# Patient Record
Sex: MALE | Race: WHITE | NOT HISPANIC OR LATINO | Employment: OTHER | URBAN - METROPOLITAN AREA
[De-identification: names, ages, dates, MRNs, and addresses within clinical notes are randomized per-mention and may not be internally consistent; named-entity substitution may affect disease eponyms.]

---

## 2017-01-27 ENCOUNTER — COMMUNICATION - HEALTHEAST (OUTPATIENT)
Dept: FAMILY MEDICINE | Facility: CLINIC | Age: 67
End: 2017-01-27

## 2017-01-27 DIAGNOSIS — E78.5 HYPERLIPIDEMIA: ICD-10-CM

## 2017-01-28 ENCOUNTER — COMMUNICATION - HEALTHEAST (OUTPATIENT)
Dept: FAMILY MEDICINE | Facility: CLINIC | Age: 67
End: 2017-01-28

## 2017-01-28 DIAGNOSIS — E78.5 HYPERLIPIDEMIA: ICD-10-CM

## 2017-05-24 ENCOUNTER — OFFICE VISIT - HEALTHEAST (OUTPATIENT)
Dept: FAMILY MEDICINE | Facility: CLINIC | Age: 67
End: 2017-05-24

## 2017-05-24 DIAGNOSIS — L04.0 ACUTE CERVICAL ADENITIS: ICD-10-CM

## 2017-07-11 ENCOUNTER — OFFICE VISIT - HEALTHEAST (OUTPATIENT)
Dept: FAMILY MEDICINE | Facility: CLINIC | Age: 67
End: 2017-07-11

## 2017-07-11 DIAGNOSIS — E78.00 PURE HYPERCHOLESTEROLEMIA: ICD-10-CM

## 2017-07-11 DIAGNOSIS — R03.0 ELEVATED BLOOD PRESSURE READING WITHOUT DIAGNOSIS OF HYPERTENSION: ICD-10-CM

## 2017-07-11 DIAGNOSIS — Z00.00 ROUTINE GENERAL MEDICAL EXAMINATION AT A HEALTH CARE FACILITY: ICD-10-CM

## 2017-07-11 DIAGNOSIS — N40.0 BPH (BENIGN PROSTATIC HYPERPLASIA): ICD-10-CM

## 2017-07-11 DIAGNOSIS — J30.9 ALLERGIC RHINITIS: ICD-10-CM

## 2017-07-11 LAB
CHOLEST SERPL-MCNC: 161 MG/DL
FASTING STATUS PATIENT QL REPORTED: YES
HDLC SERPL-MCNC: 42 MG/DL
LDLC SERPL CALC-MCNC: 101 MG/DL
PSA SERPL-MCNC: 1.7 NG/ML (ref 0–4.5)
TRIGL SERPL-MCNC: 90 MG/DL

## 2017-07-12 ENCOUNTER — COMMUNICATION - HEALTHEAST (OUTPATIENT)
Dept: FAMILY MEDICINE | Facility: CLINIC | Age: 67
End: 2017-07-12

## 2017-07-13 ENCOUNTER — AMBULATORY - HEALTHEAST (OUTPATIENT)
Dept: FAMILY MEDICINE | Facility: CLINIC | Age: 67
End: 2017-07-13

## 2017-07-14 ENCOUNTER — COMMUNICATION - HEALTHEAST (OUTPATIENT)
Dept: FAMILY MEDICINE | Facility: CLINIC | Age: 67
End: 2017-07-14

## 2017-10-18 ENCOUNTER — COMMUNICATION - HEALTHEAST (OUTPATIENT)
Dept: FAMILY MEDICINE | Facility: CLINIC | Age: 67
End: 2017-10-18

## 2017-10-18 DIAGNOSIS — K21.9 GERD (GASTROESOPHAGEAL REFLUX DISEASE): ICD-10-CM

## 2017-10-18 DIAGNOSIS — K21.9 ESOPHAGEAL REFLUX: ICD-10-CM

## 2017-10-25 ENCOUNTER — AMBULATORY - HEALTHEAST (OUTPATIENT)
Dept: NURSING | Facility: CLINIC | Age: 67
End: 2017-10-25

## 2017-10-25 DIAGNOSIS — Z23 NEEDS FLU SHOT: ICD-10-CM

## 2017-11-02 ENCOUNTER — COMMUNICATION - HEALTHEAST (OUTPATIENT)
Dept: FAMILY MEDICINE | Facility: CLINIC | Age: 67
End: 2017-11-02

## 2017-12-07 ENCOUNTER — OFFICE VISIT - HEALTHEAST (OUTPATIENT)
Dept: FAMILY MEDICINE | Facility: CLINIC | Age: 67
End: 2017-12-07

## 2017-12-07 DIAGNOSIS — B35.1 TOENAIL FUNGUS: ICD-10-CM

## 2017-12-07 ASSESSMENT — MIFFLIN-ST. JEOR: SCORE: 1825.69

## 2017-12-24 ENCOUNTER — COMMUNICATION - HEALTHEAST (OUTPATIENT)
Dept: FAMILY MEDICINE | Facility: CLINIC | Age: 67
End: 2017-12-24

## 2018-04-16 ENCOUNTER — COMMUNICATION - HEALTHEAST (OUTPATIENT)
Dept: FAMILY MEDICINE | Facility: CLINIC | Age: 68
End: 2018-04-16

## 2018-04-16 DIAGNOSIS — K21.9 GERD (GASTROESOPHAGEAL REFLUX DISEASE): ICD-10-CM

## 2018-04-16 DIAGNOSIS — K21.9 ESOPHAGEAL REFLUX: ICD-10-CM

## 2018-07-16 ENCOUNTER — COMMUNICATION - HEALTHEAST (OUTPATIENT)
Dept: FAMILY MEDICINE | Facility: CLINIC | Age: 68
End: 2018-07-16

## 2018-07-16 DIAGNOSIS — K21.9 GERD (GASTROESOPHAGEAL REFLUX DISEASE): ICD-10-CM

## 2018-07-16 DIAGNOSIS — K21.9 ESOPHAGEAL REFLUX: ICD-10-CM

## 2018-07-17 ENCOUNTER — COMMUNICATION - HEALTHEAST (OUTPATIENT)
Dept: FAMILY MEDICINE | Facility: CLINIC | Age: 68
End: 2018-07-17

## 2018-07-17 ENCOUNTER — OFFICE VISIT - HEALTHEAST (OUTPATIENT)
Dept: FAMILY MEDICINE | Facility: CLINIC | Age: 68
End: 2018-07-17

## 2018-07-17 DIAGNOSIS — H61.21 EXCESSIVE CERUMEN IN RIGHT EAR CANAL: ICD-10-CM

## 2018-07-17 DIAGNOSIS — K21.9 GERD (GASTROESOPHAGEAL REFLUX DISEASE): ICD-10-CM

## 2018-07-17 DIAGNOSIS — E78.00 PURE HYPERCHOLESTEROLEMIA: ICD-10-CM

## 2018-07-17 DIAGNOSIS — Z00.00 WELLNESS EXAMINATION: ICD-10-CM

## 2018-07-17 DIAGNOSIS — K21.9 GASTROESOPHAGEAL REFLUX DISEASE WITHOUT ESOPHAGITIS: ICD-10-CM

## 2018-07-17 DIAGNOSIS — K21.9 ESOPHAGEAL REFLUX: ICD-10-CM

## 2018-07-17 LAB
ALBUMIN SERPL-MCNC: 4.1 G/DL (ref 3.5–5)
ALP SERPL-CCNC: 66 U/L (ref 45–120)
ALT SERPL W P-5'-P-CCNC: 13 U/L (ref 0–45)
ANION GAP SERPL CALCULATED.3IONS-SCNC: 10 MMOL/L (ref 5–18)
AST SERPL W P-5'-P-CCNC: 15 U/L (ref 0–40)
BILIRUB SERPL-MCNC: 1.4 MG/DL (ref 0–1)
BUN SERPL-MCNC: 14 MG/DL (ref 8–22)
CALCIUM SERPL-MCNC: 9.5 MG/DL (ref 8.5–10.5)
CHLORIDE BLD-SCNC: 105 MMOL/L (ref 98–107)
CO2 SERPL-SCNC: 26 MMOL/L (ref 22–31)
CREAT SERPL-MCNC: 0.96 MG/DL (ref 0.7–1.3)
ERYTHROCYTE [DISTWIDTH] IN BLOOD BY AUTOMATED COUNT: 11.9 % (ref 11–14.5)
GFR SERPL CREATININE-BSD FRML MDRD: >60 ML/MIN/1.73M2
GLUCOSE BLD-MCNC: 98 MG/DL (ref 70–125)
HCT VFR BLD AUTO: 45.5 % (ref 40–54)
HGB BLD-MCNC: 15.2 G/DL (ref 14–18)
INR PPP: 0.99 (ref 0.9–1.1)
MCH RBC QN AUTO: 29.6 PG (ref 27–34)
MCHC RBC AUTO-ENTMCNC: 33.3 G/DL (ref 32–36)
MCV RBC AUTO: 89 FL (ref 80–100)
PLATELET # BLD AUTO: 229 THOU/UL (ref 140–440)
PMV BLD AUTO: 7.3 FL (ref 7–10)
POTASSIUM BLD-SCNC: 4.2 MMOL/L (ref 3.5–5)
PROT SERPL-MCNC: 7.1 G/DL (ref 6–8)
PSA SERPL-MCNC: 1.6 NG/ML (ref 0–4.5)
RBC # BLD AUTO: 5.13 MILL/UL (ref 4.4–6.2)
SODIUM SERPL-SCNC: 141 MMOL/L (ref 136–145)
WBC: 5.9 THOU/UL (ref 4–11)

## 2018-07-17 ASSESSMENT — MIFFLIN-ST. JEOR: SCORE: 1809.3

## 2018-10-18 ENCOUNTER — COMMUNICATION - HEALTHEAST (OUTPATIENT)
Dept: FAMILY MEDICINE | Facility: CLINIC | Age: 68
End: 2018-10-18

## 2018-10-18 DIAGNOSIS — K21.9 ESOPHAGEAL REFLUX: ICD-10-CM

## 2018-10-18 DIAGNOSIS — K21.9 GERD (GASTROESOPHAGEAL REFLUX DISEASE): ICD-10-CM

## 2018-10-25 ENCOUNTER — AMBULATORY - HEALTHEAST (OUTPATIENT)
Dept: NURSING | Facility: CLINIC | Age: 68
End: 2018-10-25

## 2018-10-25 DIAGNOSIS — Z23 FLU VACCINE NEED: ICD-10-CM

## 2019-05-15 ENCOUNTER — COMMUNICATION - HEALTHEAST (OUTPATIENT)
Dept: FAMILY MEDICINE | Facility: CLINIC | Age: 69
End: 2019-05-15

## 2019-06-18 ENCOUNTER — COMMUNICATION - HEALTHEAST (OUTPATIENT)
Dept: FAMILY MEDICINE | Facility: CLINIC | Age: 69
End: 2019-06-18

## 2019-07-01 ENCOUNTER — COMMUNICATION - HEALTHEAST (OUTPATIENT)
Dept: FAMILY MEDICINE | Facility: CLINIC | Age: 69
End: 2019-07-01

## 2019-07-01 DIAGNOSIS — K21.9 ESOPHAGEAL REFLUX: ICD-10-CM

## 2019-07-01 DIAGNOSIS — K21.9 GERD (GASTROESOPHAGEAL REFLUX DISEASE): ICD-10-CM

## 2019-07-13 ENCOUNTER — COMMUNICATION - HEALTHEAST (OUTPATIENT)
Dept: FAMILY MEDICINE | Facility: CLINIC | Age: 69
End: 2019-07-13

## 2019-07-16 ENCOUNTER — OFFICE VISIT - HEALTHEAST (OUTPATIENT)
Dept: FAMILY MEDICINE | Facility: CLINIC | Age: 69
End: 2019-07-16

## 2019-07-16 DIAGNOSIS — K21.9 GASTROESOPHAGEAL REFLUX DISEASE WITHOUT ESOPHAGITIS: ICD-10-CM

## 2019-07-16 DIAGNOSIS — Z12.5 ENCOUNTER FOR SCREENING FOR MALIGNANT NEOPLASM OF PROSTATE: ICD-10-CM

## 2019-07-16 DIAGNOSIS — Z00.00 ENCOUNTER FOR MEDICARE ANNUAL WELLNESS EXAM: ICD-10-CM

## 2019-07-16 DIAGNOSIS — K21.9 ESOPHAGEAL REFLUX: ICD-10-CM

## 2019-07-16 DIAGNOSIS — K21.9 GERD (GASTROESOPHAGEAL REFLUX DISEASE): ICD-10-CM

## 2019-07-16 LAB
ANION GAP SERPL CALCULATED.3IONS-SCNC: 9 MMOL/L (ref 5–18)
BUN SERPL-MCNC: 17 MG/DL (ref 8–22)
CALCIUM SERPL-MCNC: 9.6 MG/DL (ref 8.5–10.5)
CHLORIDE BLD-SCNC: 105 MMOL/L (ref 98–107)
CHOLEST SERPL-MCNC: 199 MG/DL
CO2 SERPL-SCNC: 28 MMOL/L (ref 22–31)
CREAT SERPL-MCNC: 0.97 MG/DL (ref 0.7–1.3)
ERYTHROCYTE [DISTWIDTH] IN BLOOD BY AUTOMATED COUNT: 12.1 % (ref 11–14.5)
FASTING STATUS PATIENT QL REPORTED: YES
GFR SERPL CREATININE-BSD FRML MDRD: >60 ML/MIN/1.73M2
GLUCOSE BLD-MCNC: 98 MG/DL (ref 70–125)
HBA1C MFR BLD: 5.6 % (ref 3.5–6)
HCT VFR BLD AUTO: 43.5 % (ref 40–54)
HDLC SERPL-MCNC: 44 MG/DL
HGB BLD-MCNC: 14.8 G/DL (ref 14–18)
LDLC SERPL CALC-MCNC: 132 MG/DL
MCH RBC QN AUTO: 29.9 PG (ref 27–34)
MCHC RBC AUTO-ENTMCNC: 34.1 G/DL (ref 32–36)
MCV RBC AUTO: 88 FL (ref 80–100)
PLATELET # BLD AUTO: 252 THOU/UL (ref 140–440)
PMV BLD AUTO: 7.2 FL (ref 7–10)
POTASSIUM BLD-SCNC: 4.9 MMOL/L (ref 3.5–5)
PSA SERPL-MCNC: 1.8 NG/ML (ref 0–4.5)
RBC # BLD AUTO: 4.96 MILL/UL (ref 4.4–6.2)
SODIUM SERPL-SCNC: 142 MMOL/L (ref 136–145)
TRIGL SERPL-MCNC: 114 MG/DL
WBC: 7.1 THOU/UL (ref 4–11)

## 2019-07-16 ASSESSMENT — MIFFLIN-ST. JEOR: SCORE: 1802.49

## 2019-07-17 ENCOUNTER — COMMUNICATION - HEALTHEAST (OUTPATIENT)
Dept: FAMILY MEDICINE | Facility: CLINIC | Age: 69
End: 2019-07-17

## 2019-08-11 ENCOUNTER — COMMUNICATION - HEALTHEAST (OUTPATIENT)
Dept: TELEHEALTH | Facility: CLINIC | Age: 69
End: 2019-08-11

## 2019-09-19 ENCOUNTER — OFFICE VISIT - HEALTHEAST (OUTPATIENT)
Dept: FAMILY MEDICINE | Facility: CLINIC | Age: 69
End: 2019-09-19

## 2019-09-19 ENCOUNTER — COMMUNICATION - HEALTHEAST (OUTPATIENT)
Dept: FAMILY MEDICINE | Facility: CLINIC | Age: 69
End: 2019-09-19

## 2019-09-19 DIAGNOSIS — Z00.00 ROUTINE ADULT HEALTH MAINTENANCE: ICD-10-CM

## 2019-09-19 DIAGNOSIS — T70.29XS: ICD-10-CM

## 2019-09-19 DIAGNOSIS — Z00.00 WELLNESS EXAMINATION: ICD-10-CM

## 2019-09-19 DIAGNOSIS — Z23 ENCOUNTER FOR IMMUNIZATION: ICD-10-CM

## 2019-09-29 ENCOUNTER — OFFICE VISIT - HEALTHEAST (OUTPATIENT)
Dept: FAMILY MEDICINE | Facility: CLINIC | Age: 69
End: 2019-09-29

## 2019-09-30 ENCOUNTER — COMMUNICATION - HEALTHEAST (OUTPATIENT)
Dept: FAMILY MEDICINE | Facility: CLINIC | Age: 69
End: 2019-09-30

## 2019-10-09 ENCOUNTER — RECORDS - HEALTHEAST (OUTPATIENT)
Dept: ADMINISTRATIVE | Facility: OTHER | Age: 69
End: 2019-10-09

## 2020-06-30 ENCOUNTER — RECORDS - HEALTHEAST (OUTPATIENT)
Dept: ADMINISTRATIVE | Facility: OTHER | Age: 70
End: 2020-06-30

## 2020-07-13 ENCOUNTER — RECORDS - HEALTHEAST (OUTPATIENT)
Dept: ADMINISTRATIVE | Facility: OTHER | Age: 70
End: 2020-07-13

## 2020-07-21 ENCOUNTER — OFFICE VISIT - HEALTHEAST (OUTPATIENT)
Dept: FAMILY MEDICINE | Facility: CLINIC | Age: 70
End: 2020-07-21

## 2020-07-21 DIAGNOSIS — Z00.00 WELLNESS EXAMINATION: ICD-10-CM

## 2020-07-21 DIAGNOSIS — K21.9 GERD (GASTROESOPHAGEAL REFLUX DISEASE): ICD-10-CM

## 2020-07-21 DIAGNOSIS — K21.9 ESOPHAGEAL REFLUX: ICD-10-CM

## 2020-07-21 DIAGNOSIS — Z12.5 ENCOUNTER FOR SCREENING FOR MALIGNANT NEOPLASM OF PROSTATE: ICD-10-CM

## 2020-07-21 LAB
ANION GAP SERPL CALCULATED.3IONS-SCNC: 12 MMOL/L (ref 5–18)
BUN SERPL-MCNC: 18 MG/DL (ref 8–28)
CALCIUM SERPL-MCNC: 9.5 MG/DL (ref 8.5–10.5)
CHLORIDE BLD-SCNC: 105 MMOL/L (ref 98–107)
CHOLEST SERPL-MCNC: 197 MG/DL
CO2 SERPL-SCNC: 24 MMOL/L (ref 22–31)
CREAT SERPL-MCNC: 1.04 MG/DL (ref 0.7–1.3)
ERYTHROCYTE [DISTWIDTH] IN BLOOD BY AUTOMATED COUNT: 12.2 % (ref 11–14.5)
FASTING STATUS PATIENT QL REPORTED: YES
GFR SERPL CREATININE-BSD FRML MDRD: >60 ML/MIN/1.73M2
GLUCOSE BLD-MCNC: 103 MG/DL (ref 70–125)
HBA1C MFR BLD: 5.5 % (ref 3.5–6)
HCT VFR BLD AUTO: 44.2 % (ref 40–54)
HDLC SERPL-MCNC: 41 MG/DL
HGB BLD-MCNC: 15.3 G/DL (ref 14–18)
LDLC SERPL CALC-MCNC: 136 MG/DL
MCH RBC QN AUTO: 29.8 PG (ref 27–34)
MCHC RBC AUTO-ENTMCNC: 34.6 G/DL (ref 32–36)
MCV RBC AUTO: 86 FL (ref 80–100)
PLATELET # BLD AUTO: 229 THOU/UL (ref 140–440)
PMV BLD AUTO: 7.4 FL (ref 7–10)
POTASSIUM BLD-SCNC: 4.4 MMOL/L (ref 3.5–5)
PSA SERPL-MCNC: 1.3 NG/ML (ref 0–6.5)
RBC # BLD AUTO: 5.12 MILL/UL (ref 4.4–6.2)
SODIUM SERPL-SCNC: 141 MMOL/L (ref 136–145)
TRIGL SERPL-MCNC: 99 MG/DL
WBC: 5.8 THOU/UL (ref 4–11)

## 2020-07-21 RX ORDER — PANTOPRAZOLE SODIUM 40 MG/1
40 TABLET, DELAYED RELEASE ORAL DAILY
Qty: 90 TABLET | Refills: 3 | Status: SHIPPED | OUTPATIENT
Start: 2020-07-21 | End: 2021-07-26

## 2020-07-21 ASSESSMENT — ANXIETY QUESTIONNAIRES
1. FEELING NERVOUS, ANXIOUS, OR ON EDGE: NOT AT ALL
2. NOT BEING ABLE TO STOP OR CONTROL WORRYING: NOT AT ALL

## 2020-07-21 ASSESSMENT — MIFFLIN-ST. JEOR: SCORE: 1809.53

## 2020-07-22 ENCOUNTER — COMMUNICATION - HEALTHEAST (OUTPATIENT)
Dept: FAMILY MEDICINE | Facility: CLINIC | Age: 70
End: 2020-07-22

## 2020-09-21 ENCOUNTER — COMMUNICATION - HEALTHEAST (OUTPATIENT)
Dept: FAMILY MEDICINE | Facility: CLINIC | Age: 70
End: 2020-09-21

## 2020-09-24 ENCOUNTER — AMBULATORY - HEALTHEAST (OUTPATIENT)
Dept: NURSING | Facility: CLINIC | Age: 70
End: 2020-09-24

## 2020-11-24 ENCOUNTER — RECORDS - HEALTHEAST (OUTPATIENT)
Dept: ADMINISTRATIVE | Facility: OTHER | Age: 70
End: 2020-11-24

## 2020-11-24 LAB — OCCULT BLOOD (FIT)_EXT (HISTORICAL CONVERSION): NEGATIVE

## 2020-12-24 ENCOUNTER — RECORDS - HEALTHEAST (OUTPATIENT)
Dept: HEALTH INFORMATION MANAGEMENT | Facility: CLINIC | Age: 70
End: 2020-12-24

## 2021-05-04 ENCOUNTER — OFFICE VISIT - HEALTHEAST (OUTPATIENT)
Dept: FAMILY MEDICINE | Facility: CLINIC | Age: 71
End: 2021-05-04

## 2021-05-04 DIAGNOSIS — L30.9 ECZEMA, UNSPECIFIED TYPE: ICD-10-CM

## 2021-05-04 RX ORDER — CLOBETASOL PROPIONATE 0.5 MG/G
CREAM TOPICAL
Qty: 30 G | Refills: 3 | Status: SHIPPED | OUTPATIENT
Start: 2021-05-04 | End: 2023-05-08

## 2021-05-04 ASSESSMENT — MIFFLIN-ST. JEOR: SCORE: 1822

## 2021-05-27 VITALS
HEIGHT: 71 IN | WEIGHT: 231 LBS | DIASTOLIC BLOOD PRESSURE: 80 MMHG | BODY MASS INDEX: 32.34 KG/M2 | HEART RATE: 107 BPM | SYSTOLIC BLOOD PRESSURE: 154 MMHG | OXYGEN SATURATION: 99 %

## 2021-05-30 NOTE — PROGRESS NOTES
Assessment:      Annual Wellness Visit      Plan:      1. Encounter for Medicare annual wellness exam  - HM2(CBC w/o Differential)  - Basic Metabolic Panel  - Lipid Cascade  - PSA (Prostatic-Specific Antigen), Annual Screen  - Glycosylated Hemoglobin A1C  - PSA (Prostatic-Specific Antigen), Annual Screen    2. GERD (gastroesophageal reflux disease)  - pantoprazole (PROTONIX) 40 MG tablet; Take 1 tablet (40 mg total) by mouth daily.  Dispense: 90 tablet; Refill: 3    RTC 1 year      Subjective:      Duane E Miller is a 69 y.o. male who presents for a Subsequent Annual Wellness Visit.  He has no concerns.  He is busy at the [employer] this summer, attends [employer], and has 7 grandchildren.    GERD remained stable.    Healthy Habits:   Regular Exercise: Yes  Sunscreen Use: Yes  Healthy Diet: Yes  Dental Visits Regularly: Yes  Seat Belt: Yes  Sexually active: Yes  Monthly Self Testicular Exams:  No  Hemoccults: No  Flex Sig: No  Colonoscopy: Yes  Lipid Profile: Yes  Glucose Screen: Yes  Prevention of Osteoporosis: Yes  Last Dexa: No  Guns at Home:  Yes  Guns Safety Locks:  No      Immunization History   Administered Date(s) Administered     DT (pediatric) 01/01/1998     Hep A, historic 12/15/2009, 06/30/2010     Influenza [id number], 12/22/2009     Influenza high dose, seasonal 10/27/2015, 10/14/2016, 10/25/2017, 10/25/2018     Influenza, inj, historic,unspecified 11/20/2007, 10/28/2008, 09/17/2009, 10/19/2011     Influenza, seasonal,quad inj 6-35 mos 10/05/2010, 10/17/2012, 10/16/2013, 10/14/2014     Pneumo Conj 13-V (2010&after) 10/27/2015     Pneumo Polysac 23-V 07/11/2017     Td,adult,historic,unspecified 08/08/2007     Tdap 08/08/2007, 12/29/2013     ZOSTER, LIVE 12/07/2012     Immunization status: up to date and documented.    Current Outpatient Medications   Medication Sig Dispense Refill     aspirin 81 MG EC tablet Take 81 mg by mouth daily.       fexofenadine (ALLEGRA) 180 MG tablet Take 1 tablet (180 mg total) by mouth  daily. 90 tablet 1     pantoprazole (PROTONIX) 40 MG tablet TAKE 1 TABLET BY MOUTH EVERY DAY 90 tablet 0     cholecalciferol, vitamin D3, (VITAMIN D3) 1,000 unit capsule Use As Directed.       fluocinolone (SYNALAR) 0.01 % external solution Apply topically 2 (two) times a day as needed. Apply sparingly to ears       PSYLLIUM SEED, WITH SUGAR, (METAMUCIL ORAL) Take by mouth daily. Take 1 TBSP       No current facility-administered medications for this visit.      Past Medical History:   Diagnosis Date     Allergic rhinitis, cause unspecified      GERD (gastroesophageal reflux disease)      Hypertrophy of prostate without urinary obstruction and other lower urinary tract symptoms (LUTS)      Lumbago      Pure hypercholesterolemia      Past Surgical History:   Procedure Laterality Date     MOUTH SURGERY       Amoxicillin-pot clavulanate; Atorvastatin; Benzonatate; Simvastatin; Sulfa (sulfonamide antibiotics); and Tamiflu [oseltamivir]  Family History   Problem Relation Age of Onset     Leukemia Father      Breast cancer Mother      Hypertension Mother      Arthritis Mother      Neuropathy Mother      Asthma Daughter      Asthma Son      Brain cancer Paternal Grandfather      Social History     Socioeconomic History     Marital status:      Spouse name: Not on file     Number of children: Not on file     Years of education: Not on file     Highest education level: Not on file   Occupational History     Not on file   Social Needs     Financial resource strain: Not on file     Food insecurity:     Worry: Not on file     Inability: Not on file     Transportation needs:     Medical: Not on file     Non-medical: Not on file   Tobacco Use     Smoking status: Former Smoker   Substance and Sexual Activity     Alcohol use: Not on file     Drug use: Not on file     Sexual activity: Not on file   Lifestyle     Physical activity:     Days per week: Not on file     Minutes per session: Not on file     Stress: Not on file    Relationships     Social connections:     Talks on phone: Not on file     Gets together: Not on file     Attends Voodoo service: Not on file     Active member of club or organization: Not on file     Attends meetings of clubs or organizations: Not on file     Relationship status: Not on file     Intimate partner violence:     Fear of current or ex partner: Not on file     Emotionally abused: Not on file     Physically abused: Not on file     Forced sexual activity: Not on file   Other Topics Concern     Not on file   Social History Narrative     Not on file           Review of Systems  Review of Systems          Objective:     There were no vitals filed for this visit.        Constitutional:    --Vitals as above  --No acute distress  Eyes-  --Sclera noninjected  --Lids and conjunctiva normal  ENT-  --TMs clear  --Sclera noninjected  --Pharynx not erythematous  Neck-  --Neck supple with no cervical lymphadenopathy  Lungs-  --Clear to Auscultation  Heart-  --Regular rate and rhythm  Abdomen--  --Soft, non-tender, non-distended  Skin-  --Pink and dry  Psych-  --Alert and oriented  --Normal affect

## 2021-05-30 NOTE — TELEPHONE ENCOUNTER
Refill Approved    Rx renewed per Medication Renewal Policy. Medication was last renewed on 10/19/18  #90 R-2.    Last OV 7/17/18  *Patient will need follow up with PCP prior to future refills.     Cathy Reeves, [facility] Triage/Med Refill 7/2/2019     Requested Prescriptions   Pending Prescriptions Disp Refills     pantoprazole (PROTONIX) 40 MG tablet [Pharmacy Med Name: PANTOPRAZOLE SOD DR 40 MG TAB] 90 tablet 0     Sig: TAKE 1 TABLET BY MOUTH EVERY DAY       GI Medications Refill Protocol Passed - 7/1/2019 12:33 PM        Passed - PCP or prescribing provider visit in last 12 or next 3 months.     Last office visit with prescriber/PCP: 12/7/2017 Celia Lamb MD OR same dept: Visit date not found OR same specialty: 12/7/2017 Celia Lamb MD  Last physical: 7/17/2018 Last MTM visit: Visit date not found   Next visit within 3 mo: Visit date not found  Next physical within 3 mo: Visit date not found  Prescriber OR PCP: Celia Lamb MD  Last diagnosis associated with med order: 1. GERD (gastroesophageal reflux disease)  - pantoprazole (PROTONIX) 40 MG tablet [Pharmacy Med Name: PANTOPRAZOLE SOD DR 40 MG TAB]; TAKE 1 TABLET BY MOUTH EVERY DAY  Dispense: 90 tablet; Refill: 0    2. Esophageal reflux  - pantoprazole (PROTONIX) 40 MG tablet [Pharmacy Med Name: PANTOPRAZOLE SOD DR 40 MG TAB]; TAKE 1 TABLET BY MOUTH EVERY DAY  Dispense: 90 tablet; Refill: 0    If protocol passes may refill for 12 months if within 3 months of last provider visit (or a total of 15 months).

## 2021-05-31 VITALS — BODY MASS INDEX: 32.21 KG/M2 | HEIGHT: 71 IN | WEIGHT: 230.06 LBS

## 2021-05-31 VITALS — BODY MASS INDEX: 32.27 KG/M2 | WEIGHT: 231.4 LBS

## 2021-05-31 VITALS — BODY MASS INDEX: 31.99 KG/M2 | WEIGHT: 229.4 LBS

## 2021-06-01 VITALS — HEIGHT: 70 IN | BODY MASS INDEX: 32.82 KG/M2 | WEIGHT: 229.25 LBS

## 2021-06-01 NOTE — PROGRESS NOTES
Chief Complaint   Patient presents with     Ear Problem     Pt c/o R ear feeling plugged and a little bit of pain. He had been traveling and flew at [address]50 feet as well at being at 9000 feet at [place].       HPI: Patient presents today after flying back from [place] last night.  He has right ear pain.  He is wife are [place] some of the [place] in [place] and when he flew back yesterday evening he developed right ear pain.  He has had no recent illness.  Is been no fever.  No otorrhea.    ROS: No rash no otorrhea but positive for hearing loss.    SH:    reports that he has quit smoking. He does not have any smokeless tobacco history on file.      FH: The Patient's family history includes Arthritis in his mother; Asthma in his daughter and son; Brain cancer in his paternal grandfather; Breast cancer in his mother; Hypertension in his mother; Leukemia in his father; Neuropathy in his mother.     Meds:  Duane has a current medication list which includes the following prescription(s): aspirin, fexofenadine, and pantoprazole.    O:  /70   Pulse 82   Resp 16   Wt (!) 230 lb (104.3 kg)   SpO2 100%   BMI 32.54 kg/m    Alert conversant no acute distress  Left TM normal, right TM has erythema and is retracted  Pharynx normal    A/P:   1.  Barotrauma of the right ear  The patient has an ear block after flying on airliner.  Most likely represents eustachian tube dysfunction.  Recommended Afrin dosing one time through the nares.  Not improving would recommend Sudafed.  If needed of these are effective would consider ENT referral.

## 2021-06-03 VITALS — WEIGHT: 226.7 LBS | HEIGHT: 71 IN | BODY MASS INDEX: 31.74 KG/M2

## 2021-06-03 VITALS
HEART RATE: 82 BPM | BODY MASS INDEX: 32.54 KG/M2 | SYSTOLIC BLOOD PRESSURE: 160 MMHG | DIASTOLIC BLOOD PRESSURE: 70 MMHG | WEIGHT: 230 LBS | OXYGEN SATURATION: 100 % | RESPIRATION RATE: 16 BRPM

## 2021-06-04 VITALS
OXYGEN SATURATION: 100 % | WEIGHT: 228.25 LBS | HEIGHT: 71 IN | SYSTOLIC BLOOD PRESSURE: 148 MMHG | BODY MASS INDEX: 31.95 KG/M2 | HEART RATE: 83 BPM | DIASTOLIC BLOOD PRESSURE: 68 MMHG

## 2021-06-09 NOTE — PROGRESS NOTES
"Chief Complaint   Patient presents with     Annual Wellness Visit     pt fasting        HPI: Patient presents today for annual wellness visit.  He is GERD remained stable on PPI.  His seasonal allergies remained stable on Allegra.  [place] all that has been decreased due to the coronavirus.    ROS: No chest pain shortness of breath or bowel or bladder issues reported    SH: Reviewed-see Snapshot for review.     FH: Reviewed-see Snapshot for review.    Meds:  Duane has a current medication list which includes the following prescription(s): aspirin, fexofenadine, olopatadine hcl, and pantoprazole.    O:  /68 (Patient Position: Sitting, Cuff Size: Adult Large)   Pulse 83   Ht 5' 10.5\" (1.791 m)   Wt (!) 228 lb 4 oz (103.5 kg)   SpO2 100%   BMI 32.29 kg/m    Constitutional:    --Vitals as above  --No acute distress  Eyes-  --Sclera noninjected  --Lids and conjunctiva normal  ENT-  --TMs clear  --Sclera noninjected  --Pharynx not erythematous  Neck-  --Neck supple    Lymph-  --No cervical lymphadenopathy  Lungs-  --Clear to Auscultation  Heart-  --Regular rate and rhythm  Skin-  --Pink and dry    A/P:   1. GERD (gastroesophageal reflux disease)  Stable  - pantoprazole (PROTONIX) 40 MG tablet; Take 1 tablet (40 mg total) by mouth daily.  Dispense: 90 tablet; Refill: 3    2. Wellness examination  Encourage weight loss.  Reviewed immunizations.  Reviewed colonoscopy.  - HM2(CBC w/o Differential)  - Basic Metabolic Panel  - Lipid Cascade  - Glycosylated Hemoglobin A1c  - PSA (Prostatic-Specific Antigen), Annual Screen      RTC 1 year  "

## 2021-06-10 NOTE — PROGRESS NOTES
Assessment:  1.  Anterior cervical adenitis.  2.  Elevated blood pressure, he notes history of whitecoat hypertension.    Plan: Prescribed azithromycin 250 mg-#6-2 p.o. today then 1 p.o. on days 2 through 5.  Symptom Medicare.  Warm compresses.  Then he has follow-up on June 14 with Dr. Slaughter for his physical.  At that time depending on blood pressure etc. can be decided whether or not to start blood pressure medication.    Subjective: 66-year-old male presenting for evaluation of soreness and feeling of swelling by the left ear.  He notes that [place] and she had proven influenza occur and several days [facility] and because of her [facility] they put him on Tamiflu.  He was also given benzonatate.  But he ended up having difficulty tolerating those and stopping those after 4 days because of nausea and vomiting.  He has had a feeling of puffiness by the left ear and soreness by the left ear that has persisted since that time and not cleared.  No fever.  No current cough.  He notes a history of whitecoat hypertension.  He states that he had been put on blood pressure medicine once in the past and then he had seen another doctor who had recommended he go off of it because it was thought to be white coat hypertension.  Past Medical History:   Diagnosis Date     Allergic rhinitis, cause unspecified      GERD (gastroesophageal reflux disease)      Hypertrophy of prostate without urinary obstruction and other lower urinary tract symptoms (LUTS)      Lumbago      Pure hypercholesterolemia      Allergies   Allergen Reactions     Amoxicillin-Pot Clavulanate Nausea And Vomiting     Atorvastatin      Benzonatate Other (See Comments)     Sore Throat     Simvastatin      Other: joint pain       Tamiflu [Oseltamivir] Nausea And Vomiting     Current medications include the aspirin, fexofenadine, pravastatin, pantoprazole, vitamin D.    Objective:/70  (Patient Site: Left Arm, Patient Position: Sitting, Cuff Size: Adult Large)  Pulse 72  Temp 98.2  F (36.8  C)  Wt (!) 231 lb 6.4 oz (105 kg)  BMI 32.27 kg/m2  Head normocephalic.  External ears and TMs look normal.  But there is tenderness just below and in front of the ear and sense of slight puffiness there.  No tenderness to palpation of the cheeks anteriorly or the forehead.  Neck is supple.  Lungs clear.  No wheezes rales rhonchi.  Heart regular rate and rhythm without murmur.

## 2021-06-11 NOTE — PROGRESS NOTES
/74 (Patient Site: Left Arm, Patient Position: Sitting, Cuff Size: Adult Large)  Pulse 90  Wt (!) 229 lb 6.4 oz (104.1 kg)  SpO2 100%  BMI 31.99 kg/m2    Duane E Miller is a 67 y.o. male here for physical.  His primary concern about his health is having his skin looked at particularly with several moles on his back and head.  He would also like his toenails look at.    We reviewed heart disease prevention and cancer detection.  He tells me he has a history of elevated blood pressure he believes to be white coat syndrome as his blood pressures at home have been good.  Is reluctant to consider blood pressure medication.  Active Ambulatory Problems     Diagnosis Date Noted     Obesity      Essential Hypercholesterolemia      Allergic Rhinitis      Esophageal Reflux      BPH (benign prostatic hyperplasia)      Lower Back Pain      Blood Pressure Isolated Elevated      Resolved Ambulatory Problems     Diagnosis Date Noted     No Resolved Ambulatory Problems     Past Medical History:   Diagnosis Date     Allergic rhinitis, cause unspecified      GERD (gastroesophageal reflux disease)      Hypertrophy of prostate without urinary obstruction and other lower urinary tract symptoms (LUTS)      Lumbago      Pure hypercholesterolemia      Past Surgical History:   Procedure Laterality Date     MOUTH SURGERY       Family History   Problem Relation Age of Onset     Leukemia Father      Breast cancer Mother      Hypertension Mother      Arthritis Mother      Neuropathy Mother      Asthma Daughter      Asthma Son      Brain cancer Paternal Grandfather        Current Outpatient Prescriptions:      aspirin 81 MG EC tablet, Take 81 mg by mouth daily., Disp: , Rfl:      cholecalciferol, vitamin D3, (VITAMIN D3) 1,000 unit capsule, Use As Directed., Disp: , Rfl:      fexofenadine (ALLEGRA) 180 MG tablet, Take 1 tablet (180 mg total) by mouth daily., Disp: 90 tablet, Rfl: 1     fluocinolone (SYNALAR) 0.01 % external solution,  Apply topically 2 (two) times a day as needed. Apply sparingly to ears, Disp: , Rfl:      pantoprazole (PROTONIX) 40 MG tablet, TAKE 1 TABLET BY MOUTH DAILY, Disp: 90 tablet, Rfl: 1     pravastatin (PRAVACHOL) 10 MG tablet, TAKE 1 TABLET (10 MG TOTAL) BY MOUTH EVERY EVENING., Disp: 90 tablet, Rfl: 1     PSYLLIUM SEED, WITH SUGAR, (METAMUCIL ORAL), Take by mouth daily. Take 1 TBSP, Disp: , Rfl:   Allergies   Allergen Reactions     Amoxicillin-Pot Clavulanate Nausea And Vomiting     Atorvastatin      Benzonatate Other (See Comments)     Sore Throat     Simvastatin      Other: joint pain       Tamiflu [Oseltamivir] Nausea And Vomiting     Immunization History   Administered Date(s) Administered     DT (pediatric) 01/01/1998     Hep A, historic 12/15/2009, 06/30/2010     Influenza [id number], 12/22/2009     Influenza high dose, seasonal 10/27/2015, 10/14/2016     Influenza, inj, historic 11/20/2007, 10/28/2008, 09/17/2009, 10/19/2011     Influenza, seasonal,quad inj 6-35 mos 10/05/2010, 10/17/2012, 10/16/2013, 10/14/2014     Pneumo Conj 13-V (2010&after) 10/27/2015     Pneumo Polysac 23-V 07/11/2017     Td, historic 08/08/2007     Tdap 08/08/2007, 12/29/2013     ZOSTER 12/07/2012     Social History     Social History     Marital status:      Spouse name: N/A     Number of children: N/A     Years of education: N/A     Occupational History     Not on file.     Social History Main Topics     Smoking status: Former Smoker     Smokeless tobacco: Not on file     Alcohol use Not on file     Drug use: Not on file     Sexual activity: Not on file     Other Topics Concern     Not on file     Social History Narrative    habits: He is a non-smoker nondrinker does use caffeine about 10 a day  His review of systems all otherwise negative      General Appearance:    Alert, cooperative, no distress, appears stated age   Head:    Normocephalic, without obvious abnormality, atraumatic   Eyes:    PERRL, conjunctiva/corneas clear,  EOM's intact, fundi     benign, both eyes     glasses are worn    Ears:    Normal TM's and external ear canals, both ears   Nose:   Nares normal, septum midline, mucosa normal, no drainage    or sinus tenderness   Throat:   Lips, mucosa, and tongue normal; teeth and gums normal   Neck:   Supple, symmetrical, trachea midline, no adenopathy;        thyroid:  No enlargement/tenderness/nodules; no carotid    bruit or JVD   Back:     Symmetric, no curvature, ROM normal, no CVA tenderness   Lungs:     Clear to auscultation bilaterally, respirations unlabored   Chest wall:    No tenderness or deformity   Heart:    Regular rate and rhythm, S1 and S2 normal, no murmur, rub   or gallop   Abdomen:     Soft, non-tender, bowel sounds active all four quadrants,     no masses, no organomegaly   Genitalia:    Normal male with bilateral descended testes are normal negative hernia    Rectal:    Normal tone, no masses mildly enlarged prostate otherwise normal    Extremities:   Extremities normal, atraumatic, no cyanosis or edema   Pulses:   2+ and symmetric all extremities   Skin:  he has some thickened toenails consistent with onychomycoses and we reviewed the etiology and treatment modalities the remainder of his skin exam appears normal    Lymph nodes:   Cervical, supraclavicular, and axillary nodes normal   Neurologic:   CNII-XII intact. Normal strength, sensation and reflexes       throughout    Labs pending    Assessment: General physical elevated blood pressure without true hypertension hypercholesterolemia BPH    Plan: We will call with labs when available I have given okay for Pneumovax here today continue to work at diet exercise and weight loss continue to monitor blood pressures and follow-up here in 6 months or as needed

## 2021-06-14 NOTE — PROGRESS NOTES
DATE OF SERVICE: 12/07/2017    SUBJECTIVE:  Very pleasant 67-year-old gentleman presents today for discussion of  chronic medical conditions.  His hyperlipidemia, his onychomycoses, his obesity and  his GERD are all stable.  The patient is currently taking Protonix for his GERD and  it helps him quite a bit.  He is also on terbinafine 250 mg daily for his  onychomycosis and it almost cured but he would like to continue.  In addition, takes  an 81 mg aspirin daily and is monitoring his blood pressure closely.  His goal  weight is 215, and he is currently at 230.    REVIEW OF SYSTEMS:  No chest pain, shortness of breath, fever, chills, vomiting or  diarrhea.    OBJECTIVE:  VITAL SIGNS:  Blood pressure 150/88, pulse 110, respirations 20.  HEENT:  Normal.  NECK:  Supple.  LUNGS:  Clear.  HEART:  Regular rate and rhythm.  ABDOMEN:  Nontender.  ABDOMEN:  No masses.  SKIN:  Pink and dry.    ASSESSMENT:    1.  Onychomycoses.  2.  Gastroesophageal reflux disease.  3.  Obesity.  4.  Mild hypertension, diet-controlled.  5.  Hyperlipidemia.    PLAN:  1.  We discussed statins and we will hold on those due to patient preference.  I  agree with a decision.  2.  Continue baby aspirin.  3.  Continue Protonix.  4.  Refill terbinafine and will continue to monitor.  Last LFTs were normal.  5.  Patient will come back next July after 3 returns from [place], [place] for  annual evaluation.  6.  Flu shot is up to date.      MD toñito RUDOLPH 12/07/2017 09:20:15  T 12/07/2017 09:51:40  R 12/07/2017 09:51:40  [id number]        cc:ARNALDO POWERS MD

## 2021-06-17 NOTE — PROGRESS NOTES
S:  70-year-old male presents today with multiple areas of round pruritic flaky skin on left arm abdomen and back.  Is been there for several months.  He recently returned from spending the winter in [place] where he was in the swimming pool frequently.  No bleeding of the lesions.    Medications: Allegra, aspirin, Protonix    O:   Blood pressure 154/80, pulse 107, respiration 12, weight 231  Summation of skin shows 1 area on the left arm proximal dorsal aspect, 1 area on the dorsum of the right forearm, and 2 areas on the back and 2 areas on the abdomen of approximately 3 cm each of pruritic, excoriated and seborrheic    A:   Eczema    P:   Temovate cream to be applied twice daily  Recommended good skin hydration  Follow-up.

## 2021-06-19 NOTE — LETTER
Letter by Cleia Lamb MD at      Author: Celia Lamb MD Service: -- Author Type: --    Filed:  Encounter Date: 7/17/2019 Status: (Other)         Duane E Miller  [id number]  [facility] [address]            July 17, 2019        Dear Darlene Rashad,        Below are the results from your recent visit:    Resulted Orders   HM2(CBC w/o Differential)   Result Value Ref Range    WBC 7.1 4.0 - 11.0 thou/uL    RBC 4.96 4.40 - 6.20 mill/uL    Hemoglobin 14.8 14.0 - 18.0 g/dL    Hematocrit 43.5 40.0 - 54.0 %    MCV 88 80 - 100 fL    MCH 29.9 27.0 - 34.0 pg    MCHC 34.1 32.0 - 36.0 g/dL    RDW 12.1 11.0 - 14.5 %    Platelets 252 140 - 440 thou/uL    MPV 7.2 7.0 - 10.0 fL   Basic Metabolic Panel   Result Value Ref Range    Sodium 142 136 - 145 mmol/L    Potassium 4.9 3.5 - 5.0 mmol/L    Chloride 105 98 - 107 mmol/L    CO2 28 22 - 31 mmol/L    Anion Gap, Calculation 9 5 - 18 mmol/L    Glucose 98 70 - 125 mg/dL    Calcium 9.6 8.5 - 10.5 mg/dL    BUN 17 8 - 22 mg/dL    Creatinine 0.97 0.70 - 1.30 mg/dL    GFR MDRD Af Amer >60 >60 mL/min/1.73m2    GFR MDRD Non Af Amer >60 >60 mL/min/1.73m2    Narrative    Fasting Glucose reference range is 70-99 mg/dL per  American Diabetes Association (ADA) guidelines.   Lipid Cascade   Result Value Ref Range    Cholesterol 199 <=199 mg/dL    Triglycerides 114 <=149 mg/dL    HDL Cholesterol 44 >=40 mg/dL    LDL Calculated 132 (H) <=129 mg/dL    Patient Fasting > 8hrs? Yes    PSA (Prostatic-Specific Antigen), Annual Screen   Result Value Ref Range    PSA 1.8 0.0 - 4.5 ng/mL    Narrative    Method is Abbott Prostate-Specific Antigen (PSA)  Standard-WHO 1st International (90:10)   Glycosylated Hemoglobin A1c   Result Value Ref Range    Hemoglobin A1c 5.6 3.5 - 6.0 %         Duane, your laboratory results are normal.  That is good news.  Thank you for coming in to visit.  We should visit again in 1 year.      Sincerely,        JP Lamb MD, HARIKA Nuñez  [facility]  [phone]

## 2021-06-19 NOTE — LETTER
Letter by Celia Lamb MD at      Author: Celia Lamb MD Service: -- Author Type: --    Filed:  Encounter Date: 7/17/2019 Status: (Other)         Duane E Miller  [address]  [facility] [address]            July 17, 2019        Dear Darlene Rashad,        Below are the results from your recent visit:    Resulted Orders   HM2(CBC w/o Differential)   Result Value Ref Range    WBC 7.1 4.0 - 11.0 thou/uL    RBC 4.96 4.40 - 6.20 mill/uL    Hemoglobin 14.8 14.0 - 18.0 g/dL    Hematocrit 43.5 40.0 - 54.0 %    MCV 88 80 - 100 fL    MCH 29.9 27.0 - 34.0 pg    MCHC 34.1 32.0 - 36.0 g/dL    RDW 12.1 11.0 - 14.5 %    Platelets 252 140 - 440 thou/uL    MPV 7.2 7.0 - 10.0 fL   Basic Metabolic Panel   Result Value Ref Range    Sodium 142 136 - 145 mmol/L    Potassium 4.9 3.5 - 5.0 mmol/L    Chloride 105 98 - 107 mmol/L    CO2 28 22 - 31 mmol/L    Anion Gap, Calculation 9 5 - 18 mmol/L    Glucose 98 70 - 125 mg/dL    Calcium 9.6 8.5 - 10.5 mg/dL    BUN 17 8 - 22 mg/dL    Creatinine 0.97 0.70 - 1.30 mg/dL    GFR MDRD Af Amer >60 >60 mL/min/1.73m2    GFR MDRD Non Af Amer >60 >60 mL/min/1.73m2    Narrative    Fasting Glucose reference range is 70-99 mg/dL per  American Diabetes Association (ADA) guidelines.   Lipid Cascade   Result Value Ref Range    Cholesterol 199 <=199 mg/dL    Triglycerides 114 <=149 mg/dL    HDL Cholesterol 44 >=40 mg/dL    LDL Calculated 132 (H) <=129 mg/dL    Patient Fasting > 8hrs? Yes    PSA (Prostatic-Specific Antigen), Annual Screen   Result Value Ref Range    PSA 1.8 0.0 - 4.5 ng/mL    Narrative    Method is Abbott Prostate-Specific Antigen (PSA)  Standard-WHO 1st International (90:10)   Glycosylated Hemoglobin A1c   Result Value Ref Range    Hemoglobin A1c 5.6 3.5 - 6.0 %         Duane, your laboratory results are normal.  That is good news.  Thank you for coming in to visit.  We should visit again in 1 year.      Sincerely,        JP Lamb MD, HARIKA Nuñez  [facility]  [phone]

## 2021-06-19 NOTE — PROGRESS NOTES
"  Assessment and Plan:   GERD-stable  Ceruminosis-right ear \"stable    1. Essential Hypercholesterolemia    - HM2(CBC w/o Differential)  - INR  - Comprehensive Metabolic Panel  - PSA (Prostatic-Specific Antigen), Annual Screen    2. Gastroesophageal reflux disease without esophagitis    - HM2(CBC w/o Differential)  - INR  - Comprehensive Metabolic Panel  - PSA (Prostatic-Specific Antigen), Annual Screen    3. Wellness examination    - HM2(CBC w/o Differential)  - INR  - Comprehensive Metabolic Panel  - PSA (Prostatic-Specific Antigen), Annual Screen    4. Excessive cerumen in right ear canal       The patient's current medical problems were reviewed.      The following health maintenance schedule was reviewed with the patient and provided in printed form in the after visit summary:   Health Maintenance   Topic Date Due     ADVANCE DIRECTIVES DISCUSSED WITH PATIENT  07/09/1968     FALL RISK ASSESSMENT  07/11/2018     INFLUENZA VACCINE RULE BASED (1) 08/01/2018     COLONOSCOPY  04/17/2022     TD 18+ HE  12/29/2023     PNEUMOCOCCAL POLYSACCHARIDE VACCINE AGE 65 AND OVER  Completed     PNEUMOCOCCAL CONJUGATE VACCINE FOR ADULTS (PCV13 OR PREVNAR)  Completed     ZOSTER VACCINE  Completed        Subjective:   Chief Complaint: Duane E Miller is an 68 y.o. male here for an Annual Wellness visit.   HPI: Patient presents today for annual physical exam.  His GERD is stable, he continues to take Protonix, his seasonal allergies are stable, and is continuing on Flonase and Allegra.    Socially he is actively monitoring his 7 grandchildren.  He is an active grilling person and [place]    Review of Systems:    Please see above.  The rest of the review of systems are negative for all systems.    Patient Care Team:  Celia Lamb MD as PCP - General (Family Medicine)     Patient Active Problem List   Diagnosis     Obesity     Essential Hypercholesterolemia     Allergic Rhinitis     Esophageal Reflux     " "BPH (benign prostatic hyperplasia)     Lower Back Pain     Blood Pressure Isolated Elevated     Past Medical History:   Diagnosis Date     Allergic rhinitis, cause unspecified      GERD (gastroesophageal reflux disease)      Hypertrophy of prostate without urinary obstruction and other lower urinary tract symptoms (LUTS)      Lumbago      Pure hypercholesterolemia       Past Surgical History:   Procedure Laterality Date     MOUTH SURGERY        Family History   Problem Relation Age of Onset     Leukemia Father      Breast cancer Mother      Hypertension Mother      Arthritis Mother      Neuropathy Mother      Asthma Daughter      Asthma Son      Brain cancer Paternal Grandfather       Social History     Social History     Marital status:      Spouse name: N/A     Number of children: N/A     Years of education: N/A     Occupational History     Not on file.     Social History Main Topics     Smoking status: Former Smoker     Smokeless tobacco: Not on file     Alcohol use Not on file     Drug use: Not on file     Sexual activity: Not on file     Other Topics Concern     Not on file     Social History Narrative      Current Outpatient Prescriptions   Medication Sig Dispense Refill     aspirin 81 MG EC tablet Take 81 mg by mouth daily.       cholecalciferol, vitamin D3, (VITAMIN D3) 1,000 unit capsule Use As Directed.       fexofenadine (ALLEGRA) 180 MG tablet Take 1 tablet (180 mg total) by mouth daily. 90 tablet 1     fluocinolone (SYNALAR) 0.01 % external solution Apply topically 2 (two) times a day as needed. Apply sparingly to ears       pantoprazole (PROTONIX) 40 MG tablet TAKE 1 TABLET BY MOUTH DAILY 90 tablet 0     PSYLLIUM SEED, WITH SUGAR, (METAMUCIL ORAL) Take by mouth daily. Take 1 TBSP       No current facility-administered medications for this visit.       Objective:   Vital Signs:   Visit Vitals     /80     Pulse 78     Temp 97.7  F (36.5  C)     Resp 18     Ht 5' 10.2\" (1.783 m)     Wt (!) " 229 lb 4 oz (104 kg)     SpO2 99%     BMI 32.71 kg/m2        VisionScreening:  No exam data present     PHYSICAL EXAM    Constitutional:    --Vitals as above  --No acute distress  Eyes-  --Sclera noninjected  --Lids and conjunctiva normal  ENT-  --TMs clear except for cerumen impaction in the right ear which was removed by curette  --Sclera noninjected  --Pharynx not erythematous  Neck-  --Neck supple with no cervical lymphadenopathy  Lungs-  --Clear to Auscultation  Heart-  --Regular rate and rhythm  Abdomen--  --Soft, non-tender, non-distended  Skin-  --Pink and dry  Psych-  --Alert and oriented  --Normal affect      Assessment Results 7/17/2018   Activities of Daily Living No help needed   Instrumental Activities of Daily Living No help needed   Mini Cog Total Score 4   Some recent data might be hidden     A Mini-Cog score of 0-2 suggests the possibility of dementia, score of 3-5 suggests no dementia    Identified Health Risks:

## 2021-06-20 NOTE — LETTER
Letter by Celia Lamb MD at      Author: Celia Lamb MD Service: -- Author Type: --    Filed:  Encounter Date: 7/22/2020 Status: (Other)         Duane E Miller  [address] [place] [id number]            July 22, 2020        Dear Mr. Solorzano,        Below are the results from your recent visit:    Resulted Orders   HM2(CBC w/o Differential)   Result Value Ref Range    WBC 5.8 4.0 - 11.0 thou/uL    RBC 5.12 4.40 - 6.20 mill/uL    Hemoglobin 15.3 14.0 - 18.0 g/dL    Hematocrit 44.2 40.0 - 54.0 %    MCV 86 80 - 100 fL    MCH 29.8 27.0 - 34.0 pg    MCHC 34.6 32.0 - 36.0 g/dL    RDW 12.2 11.0 - 14.5 %    Platelets 229 140 - 440 thou/uL    MPV 7.4 7.0 - 10.0 fL   Basic Metabolic Panel   Result Value Ref Range    Sodium 141 136 - 145 mmol/L    Potassium 4.4 3.5 - 5.0 mmol/L    Chloride 105 98 - 107 mmol/L    CO2 24 22 - 31 mmol/L    Anion Gap, Calculation 12 5 - 18 mmol/L    Glucose 103 70 - 125 mg/dL    Calcium 9.5 8.5 - 10.5 mg/dL    BUN 18 8 - 28 mg/dL    Creatinine 1.04 0.70 - 1.30 mg/dL    GFR MDRD Af Amer >60 >60 mL/min/1.73m2    GFR MDRD Non Af Amer >60 >60 mL/min/1.73m2    Narrative    Fasting Glucose reference range is 70-99 mg/dL per  American Diabetes Association (ADA) guidelines.   Lipid Cascade   Result Value Ref Range    Cholesterol 197 <=199 mg/dL    Triglycerides 99 <=149 mg/dL    HDL Cholesterol 41 >=40 mg/dL    LDL Calculated 136 (H) <=129 mg/dL    Patient Fasting > 8hrs? Yes    Glycosylated Hemoglobin A1c   Result Value Ref Range    Hemoglobin A1c 5.5 3.5 - 6.0 %   PSA (Prostatic-Specific Antigen), Annual Screen   Result Value Ref Range    PSA 1.3 0.0 - 6.5 ng/mL    Narrative    Method is Abbott Prostate-Specific Antigen (PSA)  Standard-WHO 1st International (90:10)         Duane, your laboratory results are normal.  That is good news.  Thank you for coming in to visit.  We should visit again in 1 year.      Sincerely,        JP Lamb MD, HARIKA Nuñez  [facility]  [phone]

## 2021-06-26 ENCOUNTER — HEALTH MAINTENANCE LETTER (OUTPATIENT)
Age: 71
End: 2021-06-26

## 2021-07-25 DIAGNOSIS — K21.9 GERD (GASTROESOPHAGEAL REFLUX DISEASE): ICD-10-CM

## 2021-07-25 DIAGNOSIS — K21.9 GASTROESOPHAGEAL REFLUX DISEASE WITHOUT ESOPHAGITIS: Primary | ICD-10-CM

## 2021-07-25 DIAGNOSIS — K21.9 ESOPHAGEAL REFLUX: ICD-10-CM

## 2021-07-25 NOTE — TELEPHONE ENCOUNTER
"Routing refill request to provider for review/approval because:  No active PCP on file    ___________________________________________________________    Copy of Last Rx:      Last office visit with provider:  5/4/21  ___________________________________________________________    Requested Prescriptions   Pending Prescriptions Disp Refills     pantoprazole (PROTONIX) 40 MG EC tablet [Pharmacy Med Name: PANTOPRAZOLE SOD DR 40 MG TAB] 90 tablet 3     Sig: TAKE 1 TABLET BY MOUTH EVERY DAY       PPI Protocol Passed - 7/25/2021 12:21 AM        Passed - Not on Clopidogrel (unless Pantoprazole ordered)        Passed - No diagnosis of osteoporosis on record        Passed - Recent (12 mo) or future (30 days) visit within the authorizing provider's specialty     Patient has had an office visit with the authorizing provider or a provider within the authorizing providers department within the previous 12 mos or has a future within next 30 days. See \"Patient Info\" tab in inbasket, or \"Choose Columns\" in Meds & Orders section of the refill encounter.              Passed - Medication is active on med list        Passed - Patient is age 18 or older             Janell Vasquez RN 07/25/21 2:59 PM  "

## 2021-07-26 RX ORDER — PANTOPRAZOLE SODIUM 40 MG/1
TABLET, DELAYED RELEASE ORAL
Qty: 90 TABLET | Refills: 3 | Status: SHIPPED | OUTPATIENT
Start: 2021-07-26 | End: 2022-08-09

## 2021-07-26 ASSESSMENT — ACTIVITIES OF DAILY LIVING (ADL): CURRENT_FUNCTION: NO ASSISTANCE NEEDED

## 2021-08-02 ENCOUNTER — OFFICE VISIT (OUTPATIENT)
Dept: FAMILY MEDICINE | Facility: CLINIC | Age: 71
End: 2021-08-02
Payer: COMMERCIAL

## 2021-08-02 VITALS
BODY MASS INDEX: 32.14 KG/M2 | SYSTOLIC BLOOD PRESSURE: 138 MMHG | HEIGHT: 71 IN | HEART RATE: 88 BPM | WEIGHT: 229.56 LBS | DIASTOLIC BLOOD PRESSURE: 60 MMHG | OXYGEN SATURATION: 100 %

## 2021-08-02 DIAGNOSIS — Z12.5 ENCOUNTER FOR SCREENING FOR MALIGNANT NEOPLASM OF PROSTATE: ICD-10-CM

## 2021-08-02 DIAGNOSIS — Z76.89 ESTABLISHING CARE WITH NEW DOCTOR, ENCOUNTER FOR: ICD-10-CM

## 2021-08-02 DIAGNOSIS — Z00.00 ENCOUNTER FOR ANNUAL WELLNESS EXAM IN MEDICARE PATIENT: Primary | ICD-10-CM

## 2021-08-02 DIAGNOSIS — K21.9 GASTROESOPHAGEAL REFLUX DISEASE WITHOUT ESOPHAGITIS: ICD-10-CM

## 2021-08-02 DIAGNOSIS — J30.1 SEASONAL ALLERGIC RHINITIS DUE TO POLLEN: ICD-10-CM

## 2021-08-02 DIAGNOSIS — Z71.89 ACP (ADVANCE CARE PLANNING): ICD-10-CM

## 2021-08-02 DIAGNOSIS — L30.9 ECZEMA, UNSPECIFIED TYPE: ICD-10-CM

## 2021-08-02 LAB
ANION GAP SERPL CALCULATED.3IONS-SCNC: 10 MMOL/L (ref 5–18)
BUN SERPL-MCNC: 15 MG/DL (ref 8–28)
CALCIUM SERPL-MCNC: 9.9 MG/DL (ref 8.5–10.5)
CHLORIDE BLD-SCNC: 104 MMOL/L (ref 98–107)
CHOLEST SERPL-MCNC: 180 MG/DL
CO2 SERPL-SCNC: 29 MMOL/L (ref 22–31)
CREAT SERPL-MCNC: 1.02 MG/DL (ref 0.7–1.3)
FASTING STATUS PATIENT QL REPORTED: NORMAL
GFR SERPL CREATININE-BSD FRML MDRD: 74 ML/MIN/1.73M2
GLUCOSE BLD-MCNC: 107 MG/DL (ref 70–125)
HDLC SERPL-MCNC: 45 MG/DL
HGB BLD-MCNC: 13.9 G/DL (ref 13.3–17.7)
HIV 1+2 AB+HIV1 P24 AG SERPL QL IA: NEGATIVE
LDLC SERPL CALC-MCNC: 124 MG/DL
POTASSIUM BLD-SCNC: 4.6 MMOL/L (ref 3.5–5)
PSA SERPL-MCNC: 1.6 UG/L (ref 0–6.5)
SODIUM SERPL-SCNC: 143 MMOL/L (ref 136–145)
TRIGL SERPL-MCNC: 54 MG/DL

## 2021-08-02 PROCEDURE — 85018 HEMOGLOBIN: CPT | Performed by: NURSE PRACTITIONER

## 2021-08-02 PROCEDURE — 36415 COLL VENOUS BLD VENIPUNCTURE: CPT | Performed by: NURSE PRACTITIONER

## 2021-08-02 PROCEDURE — G0103 PSA SCREENING: HCPCS | Performed by: NURSE PRACTITIONER

## 2021-08-02 PROCEDURE — 80061 LIPID PANEL: CPT | Performed by: NURSE PRACTITIONER

## 2021-08-02 PROCEDURE — 86803 HEPATITIS C AB TEST: CPT | Performed by: NURSE PRACTITIONER

## 2021-08-02 PROCEDURE — 99397 PER PM REEVAL EST PAT 65+ YR: CPT | Performed by: NURSE PRACTITIONER

## 2021-08-02 PROCEDURE — 80048 BASIC METABOLIC PNL TOTAL CA: CPT | Performed by: NURSE PRACTITIONER

## 2021-08-02 PROCEDURE — 87389 HIV-1 AG W/HIV-1&-2 AB AG IA: CPT | Performed by: NURSE PRACTITIONER

## 2021-08-02 RX ORDER — CLOBETASOL PROPIONATE 0.5 MG/ML
SOLUTION TOPICAL
COMMUNITY
Start: 2021-06-21 | End: 2021-08-02

## 2021-08-02 RX ORDER — KETOCONAZOLE 20 MG/ML
SHAMPOO TOPICAL
COMMUNITY
Start: 2021-06-21

## 2021-08-02 ASSESSMENT — MIFFLIN-ST. JEOR: SCORE: 1810.48

## 2021-08-02 ASSESSMENT — ACTIVITIES OF DAILY LIVING (ADL): CURRENT_FUNCTION: NO ASSISTANCE NEEDED

## 2021-08-02 NOTE — PATIENT INSTRUCTIONS
Patient Education   Personalized Prevention Plan  You are due for the preventive services outlined below.  Your care team is available to assist you in scheduling these services.  If you have already completed any of these items, please share that information with your care team to update in your medical record.  Health Maintenance Due   Topic Date Due     ANNUAL REVIEW OF HM ORDERS  Never done     Hepatitis C Screening  Never done     Zoster (Shingles) Vaccine (2 of 3) 02/01/2013     AORTIC ANEURYSM SCREENING (SYSTEM ASSIGNED)  Never done     FALL RISK ASSESSMENT  07/21/2021     Annual Wellness Visit  07/21/2021

## 2021-08-02 NOTE — PROGRESS NOTES
"SUBJECTIVE:   Duane E Miller is a 71 year old male who presents for Preventive Visit and establish care      Patient has been advised of split billing requirements and indicates understanding: Yes   Are you in the first 12 months of your Medicare coverage?  No    HPI: Medical, family and surgical history reviewed.  Patient is , he has 2 children.  He has been retired for several years now.  He is a former smoker who quit in 1987, he does not drink alcohol and denies illicit drug use.  He is up-to-date with colon cancer screening.  He has been fully Covid vaccinated, he is due for shingles vaccine.  He does not have a healthcare directive.    Initial blood pressure elevated today, 10 minutes later, blood pressure normotensive.  He has no previous history of coronary artery disease or stroke.    Reflux is well controlled using Protonix daily he has eliminated tomatoes from his diet as well which has also reduced his GERD symptoms..     Seasonal allergies to pollen, condition is currently well controlled using Allegra daily.    He does occasionally suffer from eczema flareups, he does use topical steroid cream that is prescribed to him which has been treating the condition very well.  He did see a dermatologist for a skin exam due to a raised mole on the left upper back which ended up being seborrheic keratosis.    Healthy Habits:     In general, how would you rate your overall health?  Excellent    Frequency of exercise:  4-5 days/week    Duration of exercise:  45-60 minutes    Do you usually eat at least 4 servings of fruit and vegetables a day, include whole grains    & fiber and avoid regularly eating high fat or \"junk\" foods?  Yes    Taking medications regularly:  Yes    Medication side effects:  None    Ability to successfully perform activities of daily living:  No assistance needed    Home Safety:  No safety concerns identified    Hearing Impairment:  Difficulty following a conversation in a noisy " restaurant or crowded room    In the past 6 months, have you been bothered by leaking of urine?  No    In general, how would you rate your overall mental or emotional health?  Excellent      PHQ-2 Total Score: 0    Additional concerns today:  No    Do you feel safe in your environment? Yes    Have you ever done Advance Care Planning? (For example, a Health Directive, POLST, or a discussion with a medical provider or your loved ones about your wishes): No, advance care planning information given to patient to review.  Advanced care planning was discussed at today's visit.       Fall risk       Cognitive Screening   1) Repeat 3 items (Leader, Season, Table)    2) Clock draw: NORMAL  3) 3 item recall: Recalls 3 objects  Results: 3 items recalled: COGNITIVE IMPAIRMENT LESS LIKELY    Mini-CogTM Copyright S Zaida. Licensed by the author for use in [place] immot[employer].be; reprinted with permission (helen@[facility]). All rights reserved.      Do you have sleep apnea, excessive snoring or daytime drowsiness?: no    Reviewed and updated as needed this visit by clinical staff  Tobacco  Allergies  Meds   Med Hx  Surg Hx  Fam Hx  Soc Hx        Reviewed and updated as needed this visit by Provider  Tobacco  Allergies  Meds    Surg Hx          Social History     Tobacco Use     Smoking status: Former Smoker     Smokeless tobacco: Never Used   Substance Use Topics     Alcohol use: Not on file         Alcohol Use 7/26/2021   Prescreen: >3 drinks/day or >7 drinks/week? Not Applicable         Current providers sharing in care for this patient include:   Patient Care Team:  Celia Lamb MD as PCP - General (Family Practice)  Celia Lamb MD as Assigned PCP    The following health maintenance items are reviewed in Epic and correct as of today:  Health Maintenance Due   Topic Date Due     HEPATITIS C SCREENING  Never done     ZOSTER IMMUNIZATION (2 of 3) 02/01/2013     AORTIC ANEURYSM SCREENING (SYSTEM  "ASSIGNED)  Never done     Lab work is in process          Review of Systems  Constitutional, HEENT, cardiovascular, pulmonary, gi and gu systems are negative, except as otherwise noted.    OBJECTIVE:   /60 (BP Location: Right arm, Patient Position: Right side, Cuff Size: Adult Large)   Pulse 88   Ht 1.791 m (5' 10.5\")   Wt 104.1 kg (229 lb 9 oz)   SpO2 100%   BMI 32.47 kg/m   Estimated body mass index is 32.47 kg/m  as calculated from the following:    Height as of this encounter: 1.791 m (5' 10.5\").    Weight as of this encounter: 104.1 kg (229 lb 9 oz).  Physical Exam  GENERAL: healthy, alert and no distress  EYES: Eyes grossly normal to inspection, PERRL and conjunctivae and sclerae normal  HENT: ear canals and TM's normal, nose and mouth without ulcers or lesions  NECK: no adenopathy, no asymmetry, masses, or scars and thyroid normal to palpation  RESP: lungs clear to auscultation - no rales, rhonchi or wheezes  CV: regular rate and rhythm, normal S1 S2, no S3 or S4, no murmur, click or rub, no peripheral edema and peripheral pulses strong  ABDOMEN: soft, nontender, no hepatosplenomegaly, no masses and bowel sounds normal  MS: no gross musculoskeletal defects noted, no edema  SKIN: no suspicious lesions or rashes, small patch of seborrheic keratosis on the left upper back, area is raised and hard to palpation  NEURO: Normal strength and tone, mentation intact and speech normal  PSYCH: mentation appears normal, affect normal/bright  LYMPH: no cervical, supraclavicular, axillary, or inguinal adenopathy    Diagnostic Test Results:  Labs reviewed in Epic    ASSESSMENT / PLAN:   1. Encounter for annual wellness exam in Medicare patient    - BASIC METABOLIC PANEL; Future  - Hemoglobin; Future  - Hepatitis C antibody; Future  - HIV Antigen Antibody Combo; Future  - Lipid Profile; Future  - REVIEW OF HEALTH MAINTENANCE PROTOCOL ORDERS  We did have a discussion regarding the shingles vaccination, he is not " "interested at this time.  He did complete the zoster back in 2012, he is fully Covid vaccinated.  He declined lung cancer screening, he is a former smoker who quit in 1987       2. ACP (advance care planning)    Honoring choices packet given to patient for completion, he will return to the clinic once it is notarized    Patient did establish care with me today, medical, family and surgical history were reviewed.  Seasonal allergies are well controlled on the daily dose of Allegra  GERD symptoms are currently well controlled using Protonix daily  He is using topical steroid cream as needed for eczema flare ups, currently well controlled      Patient has been advised of split billing requirements and indicates understanding: Yes  COUNSELING:  Reviewed preventive health counseling, as reflected in patient instructions       Consider AAA screening for ages 65-75 and smoking history       Regular exercise       Healthy diet/nutrition       Vision screening       Dental care       Bladder control       Fall risk prevention       Alcohol Use        Hepatitis C screening       HIV screening for high risk patient       Colon cancer screening       Prostate cancer screening    Estimated body mass index is 32.47 kg/m  as calculated from the following:    Height as of this encounter: 1.791 m (5' 10.5\").    Weight as of this encounter: 104.1 kg (229 lb 9 oz).    Weight management plan: Discussed healthy diet and exercise guidelines    He reports that he has quit smoking. He has never used smokeless tobacco.      Appropriate preventive services were discussed with this patient, including applicable screening as appropriate for cardiovascular disease, diabetes, osteopenia/osteoporosis, and glaucoma.  As appropriate for age/gender, discussed screening for colorectal cancer, prostate cancer, breast cancer, and cervical cancer. Checklist reviewing preventive services available has been given to the patient.    Reviewed patients plan " of care and provided an AVS. The Basic Care Plan (routine screening as documented in Health Maintenance) for Duane meets the Care Plan requirement. This Care Plan has been established and reviewed with the Patient.    28 minutes spent with chart review, review results, assessment, documentation      Counseling Resources:  ATP IV Guidelines  Pooled Cohorts Equation Calculator  Breast Cancer Risk Calculator  Breast Cancer: Medication to Reduce Risk  FRAX Risk Assessment  ICSI Preventive Guidelines  Dietary Guidelines for Americans, 2010  USDA's MyPlate  ASA Prophylaxis  Lung CA Screening    Lori Dumont NP  [facility]    Identified Health Risks:

## 2021-08-03 ENCOUNTER — MYC MEDICAL ADVICE (OUTPATIENT)
Dept: FAMILY MEDICINE | Facility: CLINIC | Age: 71
End: 2021-08-03

## 2021-08-03 LAB — HCV AB SERPL QL IA: NEGATIVE

## 2021-08-03 NOTE — TELEPHONE ENCOUNTER
I reviewed all lab work with patient before the labs were drawn. These are done for screening purposes due to blood draws, blood donation etc. It is only done once per lifetime so no further testing on these going forward. Protects health care staff basically along with knowing if he has any of these conditions.

## 2022-04-21 ENCOUNTER — TRANSFERRED RECORDS (OUTPATIENT)
Dept: HEALTH INFORMATION MANAGEMENT | Facility: CLINIC | Age: 72
End: 2022-04-21
Payer: COMMERCIAL

## 2022-04-21 LAB — HEMOCCULT STL QL IA: NEGATIVE

## 2022-05-04 ENCOUNTER — TELEPHONE (OUTPATIENT)
Dept: FAMILY MEDICINE | Facility: CLINIC | Age: 72
End: 2022-05-04
Payer: COMMERCIAL

## 2022-05-04 NOTE — TELEPHONE ENCOUNTER
Test today.  Symptoms: fatigue, low grade fever of 100.2 with tylenol. Stuffy nose, water eyes, headache-constant. Bodyaches.     [facility]    FYI only for Lori Dumont CNp

## 2022-05-04 NOTE — TELEPHONE ENCOUNTER
Patient tested positive for COVID (home test).  Wants to know what he should be doing next.    Please call today.  OK to LM on VM

## 2022-05-05 ENCOUNTER — VIRTUAL VISIT (OUTPATIENT)
Dept: FAMILY MEDICINE | Facility: CLINIC | Age: 72
End: 2022-05-05
Payer: COMMERCIAL

## 2022-05-05 VITALS
SYSTOLIC BLOOD PRESSURE: 120 MMHG | DIASTOLIC BLOOD PRESSURE: 80 MMHG | BODY MASS INDEX: 32.2 KG/M2 | WEIGHT: 230 LBS | HEIGHT: 71 IN

## 2022-05-05 DIAGNOSIS — Z12.11 SCREEN FOR COLON CANCER: ICD-10-CM

## 2022-05-05 DIAGNOSIS — U07.1 INFECTION DUE TO 2019 NOVEL CORONAVIRUS: Primary | ICD-10-CM

## 2022-05-05 PROCEDURE — 99214 OFFICE O/P EST MOD 30 MIN: CPT | Mod: 95 | Performed by: FAMILY MEDICINE

## 2022-05-05 NOTE — PROGRESS NOTES
"Duane is a 71 year old who is being evaluated via a billable video visit.      How would you like to obtain your AVS? MyChart  If the video visit is dropped, the invitation should be resent by: Text to cell phone: [phone]  Will anyone else be joining your video visit? No    Video Start Time: 11:33    Assessment & Plan       MASSBP Score 5/5/2022   Age Greater than or equal to 65 years 2   BMI greater than or equal to 35 kg/m2 0   Has Diabetes Mellitus 0   Has Chronic Kidney Disease 0   Has Cardiovascular Disease and 55 years or older 0   Has Chronic Respiratory Disease and 55 years or older 0   Has Hypertension and 55 years or older 0   Is Immunocompromised 0   Is Pregnant 0   Member of BIPOC community (Black/, /, ,  or , or  or [place] Native)  0   MASSBP Score 2   Has the patient had a positive COVID test outside our system?  Yes   What day did symptoms start?  5/2/2022         Infection due to 2019 novel coronavirus  Meets criteria for medication management, symptoms started on 5/2.  - nirmatrelvir and ritonavir (PAXLOVID) therapy pack; Take 3 tablets by mouth 2 times daily Take 2 Nirmatrelvir tablets and 1 Ritonavir tablet twice daily for 5 days.  Advised about rest, OTC medications for symptoms, drink warm liquids, and may use humidifier at night time to improve the cough.         BMI:   Estimated body mass index is 32.08 kg/m  as calculated from the following:    Height as of this encounter: 1.803 m (5' 11\").    Weight as of this encounter: 104.3 kg (230 lb).   Weight management plan: Discussed healthy diet and exercise guidelines          Odessa Araujo MD  [facility]    Subjective Duane is a 71 year old who presents for the following health issues     History of Present Illness       Reason for visit:  Covid  Symptom onset:  3-7 days ago  Symptoms include:  Fatigue, headache, achy muscles, cough, " runny nose, watering eyes  Symptom intensity:  Moderate  Symptom progression:  Staying the same  Had these symptoms before:  No    He eats 0-1 servings of fruits and vegetables daily.He consumes 0 sweetened beverage(s) daily.He exercises with enough effort to increase his heart rate 30 to 60 minutes per day.  He exercises with enough effort to increase his heart rate 5 days per week.   He is taking medications regularly.         COVID-19 Symptom Review  How many days ago did these symptoms start? Monday afternoon.  Then he did the test Wednesday with self test at home and was positive    Are any of the following symptoms significant for you?    New or worsening difficulty breathing? No    Worsening cough? Yes, it's a dry cough. Today there is a phlegm.    Runny nose.    Fever or chills? Yes, the highest temperature was 100.2    Headache: YES- but today the headache has resolved.    Sore throat: no    Chest pain: no    Diarrhea: no  Body aches? YES, fatigue and tired.    What treatments has patient tried? Acetaminophen and mucinex.  Does patient live in a nursing home, group home, or shelter? no  Does patient have a way to get food/medications during quarantined? Yes, I have a friend or family member who can help me.            Review of Systems   CONSTITUTIONAL:fever.  CV: NEGATIVE for chest pain, palpitations or peripheral edema      Objective           Vitals:  No vitals were obtained today due to virtual visit.    Physical Exam   GENERAL: Healthy, alert and no distress  RESP: No audible wheeze, cough, or visible cyanosis.  No visible retractions or increased work of breathing.    PSYCH: Mentation appears normal, affect normal/bright, judgement and insight intact, normal speech and appearance well-groomed.                Video-Visit Details    Type of service:  Video Visit    Video End Time:11:59 AM    Originating Location (pt. Location): Home    Distant Location (provider location):  [facility]  [place]     Platform used for Video Visit: Aleksandra

## 2022-05-05 NOTE — PATIENT INSTRUCTIONS

## 2022-08-02 ASSESSMENT — ENCOUNTER SYMPTOMS
DIZZINESS: 0
MYALGIAS: 1
SHORTNESS OF BREATH: 0
SORE THROAT: 0
COUGH: 0
NAUSEA: 0
HEARTBURN: 0
EYE PAIN: 0
CHILLS: 0
WEAKNESS: 0
NERVOUS/ANXIOUS: 0
ABDOMINAL PAIN: 0
PARESTHESIAS: 0
CONSTIPATION: 0
HEMATOCHEZIA: 0
FREQUENCY: 0
PALPITATIONS: 0
FEVER: 0
DIARRHEA: 0
HEMATURIA: 0
HEADACHES: 0
ARTHRALGIAS: 0
JOINT SWELLING: 0
DYSURIA: 0

## 2022-08-02 ASSESSMENT — ACTIVITIES OF DAILY LIVING (ADL): CURRENT_FUNCTION: NO ASSISTANCE NEEDED

## 2022-08-09 ENCOUNTER — OFFICE VISIT (OUTPATIENT)
Dept: FAMILY MEDICINE | Facility: CLINIC | Age: 72
End: 2022-08-09
Payer: COMMERCIAL

## 2022-08-09 VITALS
SYSTOLIC BLOOD PRESSURE: 160 MMHG | WEIGHT: 231.1 LBS | DIASTOLIC BLOOD PRESSURE: 84 MMHG | HEART RATE: 99 BPM | BODY MASS INDEX: 31.3 KG/M2 | RESPIRATION RATE: 16 BRPM | TEMPERATURE: 97.9 F | OXYGEN SATURATION: 100 % | HEIGHT: 72 IN

## 2022-08-09 DIAGNOSIS — Z12.5 ENCOUNTER FOR SCREENING FOR MALIGNANT NEOPLASM OF PROSTATE: ICD-10-CM

## 2022-08-09 DIAGNOSIS — J30.1 SEASONAL ALLERGIC RHINITIS DUE TO POLLEN: ICD-10-CM

## 2022-08-09 DIAGNOSIS — Z87.891 FORMER SMOKER: ICD-10-CM

## 2022-08-09 DIAGNOSIS — Z00.00 ENCOUNTER FOR ANNUAL WELLNESS EXAM IN MEDICARE PATIENT: Primary | ICD-10-CM

## 2022-08-09 DIAGNOSIS — E78.00 PURE HYPERCHOLESTEROLEMIA: ICD-10-CM

## 2022-08-09 DIAGNOSIS — Z23 NEED FOR VACCINATION: ICD-10-CM

## 2022-08-09 DIAGNOSIS — K21.9 GASTROESOPHAGEAL REFLUX DISEASE WITHOUT ESOPHAGITIS: ICD-10-CM

## 2022-08-09 LAB
ALBUMIN SERPL BCG-MCNC: 4.5 G/DL (ref 3.5–5.2)
ALP SERPL-CCNC: 70 U/L (ref 40–129)
ALT SERPL W P-5'-P-CCNC: 17 U/L (ref 10–50)
ANION GAP SERPL CALCULATED.3IONS-SCNC: 12 MMOL/L (ref 7–15)
AST SERPL W P-5'-P-CCNC: 22 U/L (ref 10–50)
BILIRUB SERPL-MCNC: 1.3 MG/DL
BUN SERPL-MCNC: 19.3 MG/DL (ref 8–23)
CALCIUM SERPL-MCNC: 9.6 MG/DL (ref 8.8–10.2)
CHLORIDE SERPL-SCNC: 103 MMOL/L (ref 98–107)
CHOLEST SERPL-MCNC: 202 MG/DL
CREAT SERPL-MCNC: 1.1 MG/DL (ref 0.67–1.17)
DEPRECATED HCO3 PLAS-SCNC: 26 MMOL/L (ref 22–29)
GFR SERPL CREATININE-BSD FRML MDRD: 71 ML/MIN/1.73M2
GLUCOSE SERPL-MCNC: 103 MG/DL (ref 70–99)
HDLC SERPL-MCNC: 47 MG/DL
HGB BLD-MCNC: 15.2 G/DL (ref 13.3–17.7)
LDLC SERPL CALC-MCNC: 139 MG/DL
NONHDLC SERPL-MCNC: 155 MG/DL
POTASSIUM SERPL-SCNC: 4.6 MMOL/L (ref 3.4–5.3)
PROT SERPL-MCNC: 7.7 G/DL (ref 6.4–8.3)
PSA SERPL-MCNC: 1.47 NG/ML (ref 0–6.5)
SODIUM SERPL-SCNC: 141 MMOL/L (ref 136–145)
TRIGL SERPL-MCNC: 79 MG/DL

## 2022-08-09 PROCEDURE — 99214 OFFICE O/P EST MOD 30 MIN: CPT | Mod: 25 | Performed by: NURSE PRACTITIONER

## 2022-08-09 PROCEDURE — 80053 COMPREHEN METABOLIC PANEL: CPT | Performed by: NURSE PRACTITIONER

## 2022-08-09 PROCEDURE — 80061 LIPID PANEL: CPT | Performed by: NURSE PRACTITIONER

## 2022-08-09 PROCEDURE — 85018 HEMOGLOBIN: CPT | Performed by: NURSE PRACTITIONER

## 2022-08-09 PROCEDURE — G0103 PSA SCREENING: HCPCS | Performed by: NURSE PRACTITIONER

## 2022-08-09 PROCEDURE — 36415 COLL VENOUS BLD VENIPUNCTURE: CPT | Performed by: NURSE PRACTITIONER

## 2022-08-09 PROCEDURE — G0439 PPPS, SUBSEQ VISIT: HCPCS | Performed by: NURSE PRACTITIONER

## 2022-08-09 RX ORDER — PANTOPRAZOLE SODIUM 40 MG/1
40 TABLET, DELAYED RELEASE ORAL DAILY
Qty: 90 TABLET | Refills: 3 | Status: SHIPPED | OUTPATIENT
Start: 2022-08-09 | End: 2023-05-08

## 2022-08-09 ASSESSMENT — ENCOUNTER SYMPTOMS
HEMATURIA: 0
NERVOUS/ANXIOUS: 0
FEVER: 0
MYALGIAS: 1
HEARTBURN: 0
FREQUENCY: 0
DIZZINESS: 0
DYSURIA: 0
ABDOMINAL PAIN: 0
CONSTIPATION: 0
JOINT SWELLING: 0
HEADACHES: 0
WEAKNESS: 0
ARTHRALGIAS: 0
CHILLS: 0
COUGH: 0
NAUSEA: 0
DIARRHEA: 0
PALPITATIONS: 0
SORE THROAT: 0
SHORTNESS OF BREATH: 0
HEMATOCHEZIA: 0
PARESTHESIAS: 0
EYE PAIN: 0

## 2022-08-09 ASSESSMENT — ACTIVITIES OF DAILY LIVING (ADL): CURRENT_FUNCTION: NO ASSISTANCE NEEDED

## 2022-08-09 ASSESSMENT — PAIN SCALES - GENERAL: PAINLEVEL: NO PAIN (0)

## 2022-08-09 NOTE — PROGRESS NOTES
"SUBJECTIVE:   Duane E Miller is a 72 year old male who presents for Preventive Visit, med check. Due for shingles vaccine. Up to date with colonoscopy.       Patient has been advised of split billing requirements and indicates understanding: Yes  Are you in the first 12 months of your Medicare coverage?  No    Healthy Habits:     In general, how would you rate your overall health?  Good    Frequency of exercise:  6-7 days/week    Duration of exercise:  15-30 minutes    Do you usually eat at least 4 servings of fruit and vegetables a day, include whole grains    & fiber and avoid regularly eating high fat or \"junk\" foods?  Yes    Taking medications regularly:  Yes    Ability to successfully perform activities of daily living:  No assistance needed    Home Safety:  No safety concerns identified    Hearing Impairment:  Difficulty understanding soft or whispered speech    In the past 6 months, have you been bothered by leaking of urine? Yes    In general, how would you rate your overall mental or emotional health?  Excellent      PHQ-2 Total Score: 0    Additional concerns today:  No    Do you feel safe in your environment? Yes    Have you ever done Advance Care Planning? (For example, a Health Directive, POLST, or a discussion with a medical provider or your loved ones about your wishes): No, advance care planning information given to patient to review.  Advanced care planning was discussed at today's visit.       Fall risk  Fallen 2 or more times in the past year?: No  Any fall with injury in the past year?: No    Cognitive Screening   1) Repeat 3 items (Leader, Season, Table)    2) Clock draw: NORMAL  3) 3 item recall: Recalls 3 objects  Results: 3 items recalled: COGNITIVE IMPAIRMENT LESS LIKELY    Mini-CogTM Copyright KATHERINE Creda. Licensed by the author for use in [facility]; reprinted with permission (helen@.[facility]). All rights reserved.          Reviewed and updated as needed this visit by clinical " staff   Tobacco  Allergies  Meds                Reviewed and updated as needed this visit by Provider                   Social History     Tobacco Use     Smoking status: Former Smoker     Quit date:      Years since quittin.6     Smokeless tobacco: Never Used   Substance Use Topics     Alcohol use: Never     If you drink alcohol do you typically have >3 drinks per day or >7 drinks per week? No    Alcohol Use 2022   Prescreen: >3 drinks/day or >7 drinks/week? No           ALLERGIES     Onset: many years now, taking Allegra daily year round    Description:   Nasal congestion: no   Sneezing: no   Red, itchy eyes: no     Progression of Symptoms:  same better intermittent    Accompanying Signs & Symptoms:  Cough: no   Wheezing: no   Rash: no   Sinus/facial pain: no    History:   Is it seasonal: during any time of the year, grass and rangel are triggers. Goes to [place] every winger  History of Asthma: no   Has allergy testing been done: YES    Precipitating factors:   Flowers and grasses, horse hair, ragweed    Alleviating factors:  Allegra daily       Therapies Tried and outcome: well controlled    GERD/Heartburn  Onset: many years now, taking Protonix 40 mg daily    Description:     Burning in chest: no     Intensity: 0/10    Progression of Symptoms: improving and intermittent    Accompanying Signs & Symptoms:  Does it feel like food gets stuck: no   Nausea: no   Vomiting (bloody?): no   Abdominal Pain: no   Black-Tarry stools: no :  Bloody stools: no     History:   Previous ulcers: no     Precipitating factors:   Caffeine use: YES- coffee, 4 to 5 cups per day  Alcohol use: no   NSAID/Aspirin use: no   Tobacco use: no   Worse with fatty foods, spicy foods and caffeinated drinks.    Alleviating factors:  Protonix daily    Therapies Tried and outcome:proton pump inhibitor: well controlled    Hyperlipidemia Follow-Up      Are you regularly taking any medication or supplement to lower your cholesterol?    No    Are you having muscle aches or other side effects that you think could be caused by your cholesterol lowering medication?  No     No previous history of stroke or heart attack     Smoker: former, quit in 1987, smoked 17 years 2 ppd    History of hypertension, is not currently taking Rx for this, checking his BP at home and reports normal readings. He was taken off his statin and BP Rx by his previous PCP, he has seen a cardiologist in the past for his hypertension as well and was told he had white coat syndrome.     Denies radiation, diaphoresis, shortness of breath, dizziness, syncope, nausea, palpitations, and associated with activity.           Current providers sharing in care for this patient include:   Patient Care Team:  Lori Dumont NP as PCP - General  Lori Dumont NP as Assigned PCP    The following health maintenance items are reviewed in Epic and correct as of today:  Health Maintenance Due   Topic Date Due     ZOSTER IMMUNIZATION (2 of 3) 02/01/2013     AORTIC ANEURYSM SCREENING (SYSTEM ASSIGNED)  Never done     Lab work is in process  Labs reviewed in EPIC  BP Readings from Last 3 Encounters:   08/09/22 (!) 160/84   05/05/22 120/80   08/02/21 138/60    Wt Readings from Last 3 Encounters:   08/09/22 104.8 kg (231 lb 1.6 oz)   05/05/22 104.3 kg (230 lb)   08/02/21 104.1 kg (229 lb 9 oz)                          Review of Systems   Constitutional: Negative for chills and fever.   HENT: Negative for congestion, ear pain, hearing loss and sore throat.    Eyes: Negative for pain and visual disturbance.   Respiratory: Negative for cough and shortness of breath.    Cardiovascular: Negative for chest pain, palpitations and peripheral edema.   Gastrointestinal: Negative for abdominal pain, constipation, diarrhea, heartburn, hematochezia and nausea.   Genitourinary: Negative for dysuria, frequency, genital sores, hematuria, impotence, penile discharge and urgency.   Musculoskeletal: Positive for  "myalgias. Negative for arthralgias and joint swelling.   Skin: Negative for rash.   Neurological: Negative for dizziness, weakness, headaches and paresthesias.   Psychiatric/Behavioral: Negative for mood changes. The patient is not nervous/anxious.          OBJECTIVE:   BP (!) 142/84 (BP Location: Right arm, Patient Position: Sitting, Cuff Size: Adult Regular)   Pulse 99   Temp 97.9  F (36.6  C) (Oral)   Resp 16   Ht 1.816 m (5' 11.5\")   Wt 104.8 kg (231 lb 1.6 oz)   SpO2 100%   BMI 31.78 kg/m   Estimated body mass index is 31.78 kg/m  as calculated from the following:    Height as of this encounter: 1.816 m (5' 11.5\").    Weight as of this encounter: 104.8 kg (231 lb 1.6 oz).  Physical Exam  GENERAL: healthy, alert and no distress  EYES: Eyes grossly normal to inspection, PERRL and conjunctivae and sclerae normal  HENT: ear canals and TM's normal, nose and mouth without ulcers or lesions  NECK: no adenopathy, no asymmetry, masses, or scars and thyroid normal to palpation  RESP: lungs clear to auscultation - no rales, rhonchi or wheezes  CV: regular rate and rhythm, normal S1 S2, no S3 or S4, no murmur, click or rub, no peripheral edema and peripheral pulses strong  ABDOMEN: soft, nontender, no hepatosplenomegaly, no masses and bowel sounds normal   (male): patient declined   MS: no gross musculoskeletal defects noted, no edema  SKIN: no suspicious lesions or rashes  NEURO: Normal strength and tone, mentation intact and speech normal  PSYCH: mentation appears normal, affect normal/bright  LYMPH: no cervical, supraclavicular, axillary, or inguinal adenopathy    Diagnostic Test Results:  Labs reviewed in Epic    ASSESSMENT / PLAN:   (Z00.00) Encounter for annual wellness exam in Medicare patient  (primary encounter diagnosis)  Comment:   Plan: REVIEW OF HEALTH MAINTENANCE PROTOCOL ORDERS,         Comprehensive metabolic panel (BMP + Alb, Alk         Phos, ALT, AST, Total. Bili, TP), PSA, screen  Honoring " choices packet given to patient for completion            (Z12.5) Encounter for screening for malignant neoplasm of prostate   Comment:   Plan: PSA, screen        Patient declined hernia and prostate exam today    (E78.00) Essential Hypercholesterolemia  Comment:   Plan: Lipid Profile        He was previously taking a statin but his previous PCP discontinued this.  We did discuss that he may possibly need to start the statin medication if his ASCVD risk is over 7%.  Patient would be willing to start a low-dose statin after discussion today, lab work is pending and he is fasting today  We did discuss his elevated blood pressures today, patient is adamant that this is related to whitecoat syndrome, he was told by a cardiologist previously that this is the condition he had.  He continues to monitor his blood pressures regularly at home and he has never had a blood pressure reading over 140/90, he has agreed to follow-up in clinic if this changes.    (K21.9) Gastroesophageal reflux disease without esophagitis  Comment:   Plan: Hemoglobin, pantoprazole (PROTONIX) 40 MG EC         Tablet  Well controlled on daily Protonix            (J30.1) Seasonal allergic rhinitis due to pollen  Comment:   Plan: well controlled on daily Allegra    (Z87.891) Former smoker  Comment:   Plan: patient declined AAA screening    (Z23) Need for vaccination  Comment:   Plan: discussed shingles vaccines, will check it out at his pharmacy    Patient has been advised of split billing requirements and indicates understanding: Yes    COUNSELING:  Reviewed preventive health counseling, as reflected in patient instructions       Consider AAA screening for ages 65-75 and smoking history       Regular exercise       Healthy diet/nutrition       Vision screening       Hearing screening       Dental care       Bladder control       Fall risk prevention       Alcohol Use        Colon cancer screening       Prostate cancer screening    Estimated body mass  "index is 31.78 kg/m  as calculated from the following:    Height as of this encounter: 1.816 m (5' 11.5\").    Weight as of this encounter: 104.8 kg (231 lb 1.6 oz).    Weight management plan: Discussed healthy diet and exercise guidelines    He reports that he quit smoking about 35 years ago. He has never used smokeless tobacco.      Appropriate preventive services were discussed with this patient, including applicable screening as appropriate for cardiovascular disease, diabetes, osteopenia/osteoporosis, and glaucoma.  As appropriate for age/gender, discussed screening for colorectal cancer, prostate cancer, breast cancer, and cervical cancer. Checklist reviewing preventive services available has been given to the patient.    Reviewed patients plan of care and provided an AVS. The Basic Care Plan (routine screening as documented in Health Maintenance) for Duane meets the Care Plan requirement. This Care Plan has been established and reviewed with the Patient.    Counseling Resources:  ATP IV Guidelines  Pooled Cohorts Equation Calculator  Breast Cancer Risk Calculator  Breast Cancer: Medication to Reduce Risk  FRAX Risk Assessment  ICSI Preventive Guidelines  Dietary Guidelines for Americans, 2010  USDA's MyPlate  ASA Prophylaxis  Lung CA Screening    Lori Dumont NP  [facility]    Identified Health Risks:  "

## 2022-08-11 DIAGNOSIS — E78.00 PURE HYPERCHOLESTEROLEMIA: Primary | ICD-10-CM

## 2022-08-11 RX ORDER — PRAVASTATIN SODIUM 10 MG
10 TABLET ORAL DAILY
Qty: 90 TABLET | Refills: 1 | Status: SHIPPED | OUTPATIENT
Start: 2022-08-11 | End: 2023-02-01

## 2022-08-15 ENCOUNTER — MYC MEDICAL ADVICE (OUTPATIENT)
Dept: FAMILY MEDICINE | Facility: CLINIC | Age: 72
End: 2022-08-15

## 2022-08-15 NOTE — TELEPHONE ENCOUNTER
Advised to monitor for 2 days and if not better, go to walk in care or schedule an appointment.  Sudha Núñez RN on 8/15/2022 at 12:17 PM

## 2022-08-22 ENCOUNTER — MYC MEDICAL ADVICE (OUTPATIENT)
Dept: FAMILY MEDICINE | Facility: CLINIC | Age: 72
End: 2022-08-22

## 2022-08-22 DIAGNOSIS — E78.00 PURE HYPERCHOLESTEROLEMIA: ICD-10-CM

## 2022-08-22 DIAGNOSIS — K21.9 GASTROESOPHAGEAL REFLUX DISEASE WITHOUT ESOPHAGITIS: ICD-10-CM

## 2022-08-22 RX ORDER — PRAVASTATIN SODIUM 10 MG
10 TABLET ORAL DAILY
Qty: 90 TABLET | Refills: 1 | OUTPATIENT
Start: 2022-08-22 | End: 2024-08-08

## 2022-08-22 RX ORDER — PANTOPRAZOLE SODIUM 40 MG/1
40 TABLET, DELAYED RELEASE ORAL DAILY
Qty: 90 TABLET | Refills: 3 | OUTPATIENT
Start: 2022-08-22 | End: 2024-08-08

## 2022-09-15 ENCOUNTER — TELEPHONE (OUTPATIENT)
Dept: FAMILY MEDICINE | Facility: CLINIC | Age: 72
End: 2022-09-15

## 2022-09-15 NOTE — TELEPHONE ENCOUNTER
Please call Duane and let him know that I had a cancellation on Sep 23 at 940.I have put him on the schedule for this time. Reschedule if this dose not work for him.       
Pt notified and pt okay with this change in appt will be here on 09/23/2022 at 9:40am.   
DX: intraductal carcinoma in situ of breast and evaluated for a scheduled left breast lumpectomy w/ leslie  localization on 1/19/21

## 2022-09-21 ENCOUNTER — E-VISIT (OUTPATIENT)
Dept: FAMILY MEDICINE | Facility: CLINIC | Age: 72
End: 2022-09-21
Payer: COMMERCIAL

## 2022-09-21 ENCOUNTER — MYC MEDICAL ADVICE (OUTPATIENT)
Dept: FAMILY MEDICINE | Facility: CLINIC | Age: 72
End: 2022-09-21

## 2022-09-21 DIAGNOSIS — H60.509 ACUTE OTITIS EXTERNA, UNSPECIFIED LATERALITY, UNSPECIFIED TYPE: Primary | ICD-10-CM

## 2022-09-21 PROCEDURE — 99422 OL DIG E/M SVC 11-20 MIN: CPT | Performed by: NURSE PRACTITIONER

## 2022-09-21 RX ORDER — NEOMYCIN SULFATE, POLYMYXIN B SULFATE, HYDROCORTISONE 3.5; 10000; 1 MG/ML; [USP'U]/ML; MG/ML
3 SOLUTION/ DROPS AURICULAR (OTIC) 4 TIMES DAILY
Qty: 10 ML | Refills: 0 | Status: SHIPPED | OUTPATIENT
Start: 2022-09-21

## 2022-09-23 ENCOUNTER — OFFICE VISIT (OUTPATIENT)
Dept: FAMILY MEDICINE | Facility: CLINIC | Age: 72
End: 2022-09-23
Payer: COMMERCIAL

## 2022-09-23 VITALS
HEIGHT: 70 IN | DIASTOLIC BLOOD PRESSURE: 66 MMHG | TEMPERATURE: 98.3 F | WEIGHT: 224 LBS | RESPIRATION RATE: 16 BRPM | OXYGEN SATURATION: 100 % | SYSTOLIC BLOOD PRESSURE: 144 MMHG | HEART RATE: 82 BPM | BODY MASS INDEX: 32.07 KG/M2

## 2022-09-23 DIAGNOSIS — L20.9 ATOPIC DERMATITIS, UNSPECIFIED TYPE: ICD-10-CM

## 2022-09-23 DIAGNOSIS — K21.9 GASTROESOPHAGEAL REFLUX DISEASE WITHOUT ESOPHAGITIS: ICD-10-CM

## 2022-09-23 DIAGNOSIS — E78.00 PURE HYPERCHOLESTEROLEMIA: ICD-10-CM

## 2022-09-23 DIAGNOSIS — H60.92 OTITIS EXTERNA OF LEFT EAR, UNSPECIFIED CHRONICITY, UNSPECIFIED TYPE: ICD-10-CM

## 2022-09-23 DIAGNOSIS — Z76.89 ESTABLISHING CARE WITH NEW DOCTOR, ENCOUNTER FOR: Primary | ICD-10-CM

## 2022-09-23 DIAGNOSIS — J30.1 SEASONAL ALLERGIC RHINITIS DUE TO POLLEN: ICD-10-CM

## 2022-09-23 DIAGNOSIS — J30.1 ALLERGIC RHINITIS DUE TO POLLEN, UNSPECIFIED SEASONALITY: ICD-10-CM

## 2022-09-23 DIAGNOSIS — R03.0 ELEVATED BLOOD PRESSURE READING WITHOUT DIAGNOSIS OF HYPERTENSION: ICD-10-CM

## 2022-09-23 PROCEDURE — G0008 ADMIN INFLUENZA VIRUS VAC: HCPCS | Performed by: PHYSICIAN ASSISTANT

## 2022-09-23 PROCEDURE — 90662 IIV NO PRSV INCREASED AG IM: CPT | Performed by: PHYSICIAN ASSISTANT

## 2022-09-23 PROCEDURE — 0124A COVID-19,PF,PFIZER BOOSTER BIVALENT: CPT | Performed by: PHYSICIAN ASSISTANT

## 2022-09-23 PROCEDURE — 91312 COVID-19,PF,PFIZER BOOSTER BIVALENT: CPT | Performed by: PHYSICIAN ASSISTANT

## 2022-09-23 PROCEDURE — 99214 OFFICE O/P EST MOD 30 MIN: CPT | Mod: 25 | Performed by: PHYSICIAN ASSISTANT

## 2022-09-23 RX ORDER — MUPIROCIN 20 MG/G
OINTMENT TOPICAL 3 TIMES DAILY
Qty: 30 G | Refills: 1 | Status: SHIPPED | OUTPATIENT
Start: 2022-09-23 | End: 2024-08-19

## 2022-09-23 ASSESSMENT — ENCOUNTER SYMPTOMS
SHORTNESS OF BREATH: 0
CONSTIPATION: 0
WEAKNESS: 0
HEADACHES: 0
NAUSEA: 0
ARTHRALGIAS: 0
SORE THROAT: 0
HEMATOCHEZIA: 0
HEARTBURN: 0
PALPITATIONS: 0
NERVOUS/ANXIOUS: 0
FREQUENCY: 0
HEMATURIA: 0
DIZZINESS: 0
MYALGIAS: 0
COUGH: 0
EYE PAIN: 0
JOINT SWELLING: 0
CHILLS: 0
DIARRHEA: 0
DYSURIA: 0
ABDOMINAL PAIN: 0
PARESTHESIAS: 0
FEVER: 0

## 2022-09-23 ASSESSMENT — PAIN SCALES - GENERAL: PAINLEVEL: NO PAIN (0)

## 2022-09-23 NOTE — PROGRESS NOTES
Assessment & Plan       ICD-10-CM    1. Establishing care with new doctor, encounter for  Z76.89 CBC with platelets   2. Blood Pressure Isolated Elevated  R03.0    3. Essential Hypercholesterolemia  E78.00 Lipid panel reflex to direct LDL Fasting     CBC with platelets   4. Gastroesophageal reflux disease without esophagitis  K21.9    5. Seasonal allergic rhinitis due to pollen  J30.1    6. Atopic dermatitis, unspecified type  L20.9 mupirocin (BACTROBAN) 2 % external ointment   7. Otitis externa of left ear, unspecified chronicity, unspecified type  H60.92    8. Allergic rhinitis due to pollen, unspecified seasonality  J30.1      #1 hyperlipidemia-last lipid panel 8/2022 showed an LDL of 139.  His statin medication was discontinued by his previous healthcare provider but his more recently PCP restarted him on this due to increased risks of a coronary event.  He was restarted on pravastatin 10 mg about a month ago.  He is tolerating this well.  We will plan to recheck this in about 2 months to make sure that his cholesterol is improving.  Diet and exercise also were discussed in detail today.    #2 elevated blood pressure without the diagnosis of hypertension-blood pressure is 144/66 today.  He states he checks his blood pressure at home 3-4 times a week and they are typically 120 systolically.  He is otherwise asymptomatic.  He states that his blood pressure does increase when he comes into the clinic.  I do not think we need to start medications today.  He is to continue to closely monitor this.  If his blood pressure is greater than 140/90 he is to let me know.    #3 GERD-currently well controlled on Protonix.  He did have an EGD in the 1970s which showed a hiatal hernia.  He feels that his symptoms are well controlled at this time and does not feel that he needs to repeat that EGD.  If he develops breakthrough symptoms while on the Protonix I would recommend getting a repeat EGD.    #4 seasonal  allergies-currently on Allegra.  Symptoms are well controlled on the medication    #5 eczema/seborrheic dermatitis-using topical ketoconazole.    #6 colon cancer screening-last colonoscopy was 11 years ago and per his report it was normal.  He does not wish to do any further colonoscopies but will do a fit test.  Last fit test was 4/2022 and was negative.    #7 prostate cancer screening- last PSA 8/2022 was stable at 1.47.    #8 acute otitis externa-he does have what appears to be an outer ear infection.  He is on Polytrim.  Recommended continue this.    #9 immunizations- we will update influenza and COVID vaccines    Labs from 8/2022 including a CMP, hemoglobin, lipid, and PSA were within normal limits.  We will plan to check a CBC along with a lipid panel in November.    [id number]}    No follow-ups on file.   Follow-up Visit   Expected date:  Mar 23, 2023 (Approximate)      Follow Up Appointment Details:     Follow-up with whom?: PCP    Follow-Up for what?: Adult Preventive    How?: In Person                    Mark Anthony Rudolph PA-C  [facility]    Subjective Duane is a 72 year old, presenting for the following health issues:  Establish Care and Ear Problem (Started TX yesterday for left ear inf)      Twin is a pleasant 72-year-old male that presents to the clinic today to establish care.  He has a past medical history of hyperlipidemia, GERD, allergies, obesity, and elevated blood pressure without the diagnosis of hypertension.  He is currently on pravastatin 10 mg daily, Protonix for reflux and Allegra for seasonal allergies.  He is feeling well today and has no questions or concerns regarding his chronic health.    Was recently diagnosed with seborrheic dermatitis and has been using ketoconazole shampoo which has been helping.  He was recently seen through ED visit for left ear pain and was started on Polytrim drops and this has helped since he started the drops.    He did have a  "peritoneal abscess February 2022 was started on antibiotics and topical mupirocin and this has since resolved.    History of Present Illness       Reason for visit:  I need a new PCP    He eats 4 or more servings of fruits and vegetables daily.He consumes 0 sweetened beverage(s) daily.He exercises with enough effort to increase his heart rate 30 to 60 minutes per day.  He exercises with enough effort to increase his heart rate 5 days per week.   He is taking medications regularly.         Review of Systems   Constitutional: Negative for chills and fever.   HENT: Positive for ear pain (left). Negative for congestion, ear discharge, hearing loss and sore throat.    Eyes: Negative for pain and visual disturbance.   Respiratory: Negative for cough and shortness of breath.    Cardiovascular: Negative for chest pain, palpitations and peripheral edema.   Gastrointestinal: Negative for abdominal pain, constipation, diarrhea, heartburn, hematochezia and nausea.   Genitourinary: Negative for dysuria, frequency, genital sores, hematuria, impotence, penile discharge and urgency.   Musculoskeletal: Negative for arthralgias, joint swelling and myalgias.   Skin: Negative for rash.   Neurological: Negative for dizziness, weakness, headaches and paresthesias.   Psychiatric/Behavioral: Negative for mood changes. The patient is not nervous/anxious.             Objective    BP (!) 144/66 (BP Location: Left arm, Patient Position: Sitting, Cuff Size: Adult Large)   Pulse 82   Temp 98.3  F (36.8  C) (Oral)   Resp 16   Ht 1.784 m (5' 10.25\")   Wt 101.6 kg (224 lb)   SpO2 100%   BMI 31.91 kg/m    Body mass index is 31.91 kg/m .     Physical Exam  Vitals and nursing note reviewed.   Constitutional:       General: He is not in acute distress.     Appearance: Normal appearance. He is not ill-appearing, toxic-appearing or diaphoretic.   HENT:      Head: Normocephalic and atraumatic.      Right Ear: Tympanic membrane, ear canal and " external ear normal.      Left Ear: Tympanic membrane and external ear normal. Swelling present. No mastoid tenderness. Tympanic membrane is not perforated, erythematous, retracted or bulging.      Ears:      Comments: Swelling to the left canal.  TM without any signs of infection     Mouth/Throat:      Pharynx: Oropharynx is clear. No oropharyngeal exudate or posterior oropharyngeal erythema.   Eyes:      General: Lids are normal.         Right eye: No discharge.         Left eye: No discharge.      Conjunctiva/sclera: Conjunctivae normal.   Cardiovascular:      Rate and Rhythm: Normal rate and regular rhythm.      Heart sounds: No murmur heard.    No friction rub. No gallop.   Pulmonary:      Effort: Pulmonary effort is normal.      Breath sounds: No wheezing, rhonchi or rales.   Abdominal:      General: Bowel sounds are normal.      Palpations: Abdomen is soft.      Tenderness: There is no abdominal tenderness. There is no guarding or rebound.   Musculoskeletal:      Cervical back: Neck supple.   Neurological:      Mental Status: He is alert.

## 2022-10-23 ENCOUNTER — E-VISIT (OUTPATIENT)
Dept: URGENT CARE | Facility: CLINIC | Age: 72
End: 2022-10-23
Payer: COMMERCIAL

## 2022-10-23 DIAGNOSIS — R30.0 DIFFICULT OR PAINFUL URINATION: Primary | ICD-10-CM

## 2022-10-23 PROCEDURE — 99207 PR NON-BILLABLE SERV PER CHARTING: CPT | Performed by: FAMILY MEDICINE

## 2022-10-23 NOTE — PATIENT INSTRUCTIONS
Dear Duane E Miller,    We are sorry you are not feeling well. Based on the responses you provided, it is recommended that you be seen in-person in urgent care so we can better evaluate your symptoms. Please click here to find the nearest urgent care location to you.   You will not be charged for this Visit. Thank you for trusting us with your care.    Elba Vinson MD

## 2022-10-25 ENCOUNTER — MYC MEDICAL ADVICE (OUTPATIENT)
Dept: FAMILY MEDICINE | Facility: CLINIC | Age: 72
End: 2022-10-25

## 2022-10-31 ENCOUNTER — MYC MEDICAL ADVICE (OUTPATIENT)
Dept: FAMILY MEDICINE | Facility: CLINIC | Age: 72
End: 2022-10-31

## 2022-10-31 DIAGNOSIS — E78.00 PURE HYPERCHOLESTEROLEMIA: Primary | ICD-10-CM

## 2022-11-02 ENCOUNTER — LAB (OUTPATIENT)
Dept: LAB | Facility: CLINIC | Age: 72
End: 2022-11-02
Payer: COMMERCIAL

## 2022-11-02 DIAGNOSIS — E78.00 PURE HYPERCHOLESTEROLEMIA: ICD-10-CM

## 2022-11-02 DIAGNOSIS — Z76.89 ESTABLISHING CARE WITH NEW DOCTOR, ENCOUNTER FOR: ICD-10-CM

## 2022-11-02 LAB
CHOLEST SERPL-MCNC: 162 MG/DL
ERYTHROCYTE [DISTWIDTH] IN BLOOD BY AUTOMATED COUNT: 12.2 % (ref 10–15)
HCT VFR BLD AUTO: 43.7 % (ref 40–53)
HDLC SERPL-MCNC: 42 MG/DL
HGB BLD-MCNC: 14.7 G/DL (ref 13.3–17.7)
LDLC SERPL CALC-MCNC: 105 MG/DL
MCH RBC QN AUTO: 29.7 PG (ref 26.5–33)
MCHC RBC AUTO-ENTMCNC: 33.6 G/DL (ref 31.5–36.5)
MCV RBC AUTO: 88 FL (ref 78–100)
NONHDLC SERPL-MCNC: 120 MG/DL
PLATELET # BLD AUTO: 277 10E3/UL (ref 150–450)
RBC # BLD AUTO: 4.95 10E6/UL (ref 4.4–5.9)
TRIGL SERPL-MCNC: 76 MG/DL
WBC # BLD AUTO: 7.3 10E3/UL (ref 4–11)

## 2022-11-02 PROCEDURE — 36415 COLL VENOUS BLD VENIPUNCTURE: CPT

## 2022-11-02 PROCEDURE — 85027 COMPLETE CBC AUTOMATED: CPT

## 2022-11-02 PROCEDURE — 80061 LIPID PANEL: CPT

## 2023-01-30 ENCOUNTER — MYC MEDICAL ADVICE (OUTPATIENT)
Dept: FAMILY MEDICINE | Facility: CLINIC | Age: 73
End: 2023-01-30
Payer: COMMERCIAL

## 2023-01-30 DIAGNOSIS — E78.00 PURE HYPERCHOLESTEROLEMIA: ICD-10-CM

## 2023-01-31 NOTE — TELEPHONE ENCOUNTER
Chief Complaint   Patient presents with     Refill Request     Pravastatin  10 mg   Sudha Núñez RN on 1/31/2023 at 7:24 AM

## 2023-02-01 DIAGNOSIS — E78.00 PURE HYPERCHOLESTEROLEMIA: ICD-10-CM

## 2023-02-01 RX ORDER — PRAVASTATIN SODIUM 10 MG
10 TABLET ORAL DAILY
Qty: 90 TABLET | Refills: 1 | OUTPATIENT
Start: 2023-02-01

## 2023-02-01 RX ORDER — PRAVASTATIN SODIUM 10 MG
10 TABLET ORAL DAILY
Qty: 90 TABLET | Refills: 1 | Status: SHIPPED | OUTPATIENT
Start: 2023-02-01 | End: 2023-05-09

## 2023-02-01 NOTE — TELEPHONE ENCOUNTER
"Routing refill request to provider for review/approval because:  Drug interaction warning    Last Written Prescription Date:  8/11/22  Last Fill Quantity: 90,  # refills: 1   Last office visit provider:  9/23/22     Requested Prescriptions   Pending Prescriptions Disp Refills     pravastatin (PRAVACHOL) 10 MG tablet 90 tablet 1     Sig: Take 1 tablet (10 mg) by mouth daily       Statins Protocol Passed - 1/31/2023  7:24 AM        Passed - LDL on file in past 12 months     Recent Labs   Lab Test 11/02/22  0832   *             Passed - No abnormal creatine kinase in past 12 months     No lab results found.             Passed - Recent (12 mo) or future (30 days) visit within the authorizing provider's specialty     Patient has had an office visit with the authorizing provider or a provider within the authorizing providers department within the previous 12 mos or has a future within next 30 days. See \"Patient Info\" tab in inbasket, or \"Choose Columns\" in Meds & Orders section of the refill encounter.              Passed - Medication is active on med list        Passed - Patient is age 18 or older             Addie Cai, RN 01/31/23 7:57 PM  "

## 2023-03-13 ENCOUNTER — MYC MEDICAL ADVICE (OUTPATIENT)
Dept: FAMILY MEDICINE | Facility: CLINIC | Age: 73
End: 2023-03-13
Payer: COMMERCIAL

## 2023-03-13 NOTE — TELEPHONE ENCOUNTER
[place] advised he go to closest urgent care or ED.  Sudha Núñez RN on 3/13/2023 at 9:14 AM

## 2023-05-08 ENCOUNTER — OFFICE VISIT (OUTPATIENT)
Dept: FAMILY MEDICINE | Facility: CLINIC | Age: 73
End: 2023-05-08
Payer: COMMERCIAL

## 2023-05-08 VITALS
TEMPERATURE: 98.4 F | RESPIRATION RATE: 12 BRPM | SYSTOLIC BLOOD PRESSURE: 140 MMHG | HEART RATE: 75 BPM | WEIGHT: 225 LBS | HEIGHT: 70 IN | DIASTOLIC BLOOD PRESSURE: 80 MMHG | OXYGEN SATURATION: 98 % | BODY MASS INDEX: 32.21 KG/M2

## 2023-05-08 DIAGNOSIS — L30.9 ECZEMA, UNSPECIFIED TYPE: ICD-10-CM

## 2023-05-08 DIAGNOSIS — E78.00 PURE HYPERCHOLESTEROLEMIA: Primary | ICD-10-CM

## 2023-05-08 DIAGNOSIS — E78.00 PURE HYPERCHOLESTEROLEMIA: ICD-10-CM

## 2023-05-08 DIAGNOSIS — L21.9 SEBORRHEIC DERMATITIS: ICD-10-CM

## 2023-05-08 DIAGNOSIS — K21.9 GASTROESOPHAGEAL REFLUX DISEASE WITHOUT ESOPHAGITIS: ICD-10-CM

## 2023-05-08 LAB
ALBUMIN SERPL BCG-MCNC: 4.6 G/DL (ref 3.5–5.2)
ALP SERPL-CCNC: 63 U/L (ref 40–129)
ALT SERPL W P-5'-P-CCNC: 16 U/L (ref 10–50)
AST SERPL W P-5'-P-CCNC: 18 U/L (ref 10–50)
BILIRUB DIRECT SERPL-MCNC: 0.25 MG/DL (ref 0–0.3)
BILIRUB SERPL-MCNC: 1.1 MG/DL
CHOLEST SERPL-MCNC: 158 MG/DL
HDLC SERPL-MCNC: 48 MG/DL
LDLC SERPL CALC-MCNC: 96 MG/DL
NONHDLC SERPL-MCNC: 110 MG/DL
PROT SERPL-MCNC: 7.6 G/DL (ref 6.4–8.3)
TRIGL SERPL-MCNC: 68 MG/DL

## 2023-05-08 PROCEDURE — 36415 COLL VENOUS BLD VENIPUNCTURE: CPT | Performed by: PHYSICIAN ASSISTANT

## 2023-05-08 PROCEDURE — 80061 LIPID PANEL: CPT | Performed by: PHYSICIAN ASSISTANT

## 2023-05-08 PROCEDURE — 80076 HEPATIC FUNCTION PANEL: CPT | Performed by: PHYSICIAN ASSISTANT

## 2023-05-08 PROCEDURE — 99214 OFFICE O/P EST MOD 30 MIN: CPT | Performed by: PHYSICIAN ASSISTANT

## 2023-05-08 RX ORDER — PRAVASTATIN SODIUM 10 MG
10 TABLET ORAL DAILY
Status: CANCELLED | OUTPATIENT
Start: 2023-05-08

## 2023-05-08 RX ORDER — PANTOPRAZOLE SODIUM 40 MG/1
40 TABLET, DELAYED RELEASE ORAL DAILY
Qty: 90 TABLET | Refills: 3 | Status: SHIPPED | OUTPATIENT
Start: 2023-05-08 | End: 2024-08-19

## 2023-05-08 RX ORDER — CLOBETASOL PROPIONATE 0.5 MG/G
CREAM TOPICAL
Qty: 30 G | Refills: 3 | Status: SHIPPED | OUTPATIENT
Start: 2023-05-08

## 2023-05-08 RX ORDER — FLUOCINOLONE ACETONIDE 0.11 MG/ML
OIL TOPICAL
COMMUNITY
Start: 2022-12-02

## 2023-05-08 ASSESSMENT — ENCOUNTER SYMPTOMS
SHORTNESS OF BREATH: 0
HEADACHES: 0
LIGHT-HEADEDNESS: 0
PALPITATIONS: 0
CONSTITUTIONAL NEGATIVE: 1
WHEEZING: 0
COUGH: 0
DIZZINESS: 0
GASTROINTESTINAL NEGATIVE: 1

## 2023-05-08 NOTE — PROGRESS NOTES
"  Assessment & Plan       ICD-10-CM    1. Essential Hypercholesterolemia  E78.00 Lipid panel reflex to direct LDL Fasting     Hepatic panel (Albumin, ALT, AST, Bili, Alk Phos, TP)     Lipid panel reflex to direct LDL Fasting     Hepatic panel (Albumin, ALT, AST, Bili, Alk Phos, TP)      2. Pure hypercholesterolemia  E78.00       3. Gastroesophageal reflux disease without esophagitis  K21.9 pantoprazole (PROTONIX) 40 MG EC tablet      4. Eczema, unspecified type  L30.9 clobetasol (TEMOVATE) 0.05 % external cream      5. Seborrheic dermatitis  L21.9         #1 hyperlipidemia  Recheck fasting lipid panel and LFTs today.  We will adjust medications accordingly  Continue to watch diet and stay active  Last lipid panel 11/20/2022 showed an , triglyceride of 67, HDL 42    #2 elevated blood pressure without the diagnosis of hypertension  Blood pressure 140/80 today  Blood pressures at home are typically 120/40  We will continue to monitor  Watch salt and alcohol intake and stay active    #3 seborrheic dermatitis  Continue follow-up with dermatology although this is fully resolved  Continue to use ketoconazole 2-3 times a week as needed    #4 eczema  Clobetasol refilled    #5 GERD  Controlled on Protonix.  Refills sent today    [id number]}     BMI:   Estimated body mass index is 32.05 kg/m  as calculated from the following:    Height as of this encounter: 1.784 m (5' 10.25\").    Weight as of this encounter: 102.1 kg (225 lb).   Weight management plan: Discussed healthy diet and exercise guidelines      ISAIAS [facility]   Duane is a 72 year old, presenting for the following health issues:  Recheck Medication (Medication management)        5/8/2023     9:25 AM   Additional Questions   Roomed by Martha Saleem   Accompanied by angelita     Duane is a pleasant 72-year-old male who presents the clinic today for follow-up on hyperlipidemia and refill on Protonix.  He was " "started on pravastatin 10 mg daily 11/20/2022 for elevated cholesterol level.  He has been tolerating the medication well.  He also needs refill on Protonix today.  Been tolerating the Protonix well and has been helping with his reflux.  We are also monitoring his blood pressure.  He checks his blood pressure on occasion at home and is usually 120s over 40s.  Blood pressure today is 140/80.    He does have underlying seborrheic dermatitis and does follow with dermatology.  Had a couple of MRSA spots on the back of his head was treated with antibiotics and the seborrheic dermatitis along with the sores on the back of his head has fully resolved.  He is still using ketoconazole shampoo 2-3 times a week.  He is also doing light therapy.    History of Present Illness       Hyperlipidemia:  He presents for follow up of hyperlipidemia.  He is taking medication to lower cholesterol. He is not having myalgia or other side effects to statin medications.    He eats 2-3 servings of fruits and vegetables daily.He consumes 0 sweetened beverage(s) daily.He exercises with enough effort to increase his heart rate 20 to 29 minutes per day.  He exercises with enough effort to increase his heart rate 5 days per week.   He is taking medications regularly.         Review of Systems   Constitutional: Negative.    HENT: Negative.    Respiratory: Negative for cough, shortness of breath and wheezing.    Cardiovascular: Negative for chest pain and palpitations.   Gastrointestinal: Negative.    Skin: Positive for rash.        Eczema to back   Neurological: Negative for dizziness, light-headedness and headaches.            Objective    BP (!) 140/80 (BP Location: Right arm, Patient Position: Sitting, Cuff Size: Adult Regular)   Pulse 75   Temp 98.4  F (36.9  C) (Oral)   Resp 12   Ht 1.784 m (5' 10.25\")   Wt 102.1 kg (225 lb)   SpO2 98%   BMI 32.05 kg/m    Body mass index is 32.05 kg/m .  Physical Exam  Vitals and nursing note reviewed. "   Constitutional:       Appearance: Normal appearance.   HENT:      Head: Normocephalic and atraumatic.   Eyes:      Conjunctiva/sclera: Conjunctivae normal.   Cardiovascular:      Rate and Rhythm: Normal rate and regular rhythm.      Heart sounds: No murmur heard.     No friction rub. No gallop.   Pulmonary:      Effort: Pulmonary effort is normal.      Breath sounds: No wheezing, rhonchi or rales.   Skin:     Comments: Eczema to the back   Neurological:      General: No focal deficit present.      Mental Status: He is alert and oriented to person, place, and time.      GCS: GCS eye subscore is 4. GCS verbal subscore is 5. GCS motor subscore is 6.      Gait: Gait is intact.   Psychiatric:         Mood and Affect: Mood normal.         Behavior: Behavior normal.         Thought Content: Thought content normal.         Judgment: Judgment normal.

## 2023-05-09 RX ORDER — PRAVASTATIN SODIUM 20 MG
20 TABLET ORAL DAILY
Qty: 90 TABLET | Refills: 3 | Status: SHIPPED | OUTPATIENT
Start: 2023-05-09 | End: 2024-04-22

## 2023-07-28 RX ORDER — PRAVASTATIN SODIUM 10 MG
TABLET ORAL
Qty: 90 TABLET | OUTPATIENT
Start: 2023-07-28

## 2023-07-28 NOTE — TELEPHONE ENCOUNTER
Refill request refused. Last Rx on 5/9/23, qty 90, refills 3.   Viktoria Hutson, CARLOS 07/28/23 12:19 PM

## 2023-08-04 ASSESSMENT — ENCOUNTER SYMPTOMS
FREQUENCY: 1
HEADACHES: 0
HEARTBURN: 0
CONSTIPATION: 0
NERVOUS/ANXIOUS: 1
NAUSEA: 0
PALPITATIONS: 0
DIARRHEA: 0
SORE THROAT: 0
ABDOMINAL PAIN: 0
HEMATURIA: 0
JOINT SWELLING: 0
EYE PAIN: 0
CHILLS: 0
DYSURIA: 0
DIZZINESS: 0
PARESTHESIAS: 0
COUGH: 0
SHORTNESS OF BREATH: 0
FEVER: 0
HEMATOCHEZIA: 0
MYALGIAS: 0
ARTHRALGIAS: 0
WEAKNESS: 0

## 2023-08-04 ASSESSMENT — ACTIVITIES OF DAILY LIVING (ADL): CURRENT_FUNCTION: NO ASSISTANCE NEEDED

## 2023-08-09 PROBLEM — Z22.322 MRSA CARRIER: Status: ACTIVE | Noted: 2023-02-02

## 2023-08-11 ENCOUNTER — OFFICE VISIT (OUTPATIENT)
Dept: FAMILY MEDICINE | Facility: CLINIC | Age: 73
End: 2023-08-11
Payer: COMMERCIAL

## 2023-08-11 VITALS
BODY MASS INDEX: 32.35 KG/M2 | WEIGHT: 226 LBS | OXYGEN SATURATION: 99 % | TEMPERATURE: 98.4 F | HEIGHT: 70 IN | HEART RATE: 81 BPM | SYSTOLIC BLOOD PRESSURE: 150 MMHG | RESPIRATION RATE: 14 BRPM | DIASTOLIC BLOOD PRESSURE: 62 MMHG

## 2023-08-11 DIAGNOSIS — Z12.5 SCREENING FOR PROSTATE CANCER: ICD-10-CM

## 2023-08-11 DIAGNOSIS — J30.1 SEASONAL ALLERGIC RHINITIS DUE TO POLLEN: ICD-10-CM

## 2023-08-11 DIAGNOSIS — R03.0 ELEVATED BLOOD PRESSURE READING WITHOUT DIAGNOSIS OF HYPERTENSION: ICD-10-CM

## 2023-08-11 DIAGNOSIS — Z13.6 ENCOUNTER FOR ABDOMINAL AORTIC ANEURYSM (AAA) SCREENING: ICD-10-CM

## 2023-08-11 DIAGNOSIS — K21.9 GASTROESOPHAGEAL REFLUX DISEASE WITHOUT ESOPHAGITIS: ICD-10-CM

## 2023-08-11 DIAGNOSIS — Z00.00 MEDICARE ANNUAL WELLNESS VISIT, SUBSEQUENT: Primary | ICD-10-CM

## 2023-08-11 DIAGNOSIS — F41.9 ANXIETY: ICD-10-CM

## 2023-08-11 DIAGNOSIS — N40.0 BENIGN PROSTATIC HYPERPLASIA WITHOUT LOWER URINARY TRACT SYMPTOMS: ICD-10-CM

## 2023-08-11 DIAGNOSIS — E78.00 PURE HYPERCHOLESTEROLEMIA: ICD-10-CM

## 2023-08-11 DIAGNOSIS — Z12.11 SCREEN FOR COLON CANCER: ICD-10-CM

## 2023-08-11 LAB
ALBUMIN SERPL BCG-MCNC: 4.5 G/DL (ref 3.5–5.2)
ALP SERPL-CCNC: 61 U/L (ref 40–129)
ALT SERPL W P-5'-P-CCNC: 13 U/L (ref 0–70)
ANION GAP SERPL CALCULATED.3IONS-SCNC: 12 MMOL/L (ref 7–15)
AST SERPL W P-5'-P-CCNC: 22 U/L (ref 0–45)
BILIRUB SERPL-MCNC: 0.9 MG/DL
BUN SERPL-MCNC: 18.3 MG/DL (ref 8–23)
CALCIUM SERPL-MCNC: 9.3 MG/DL (ref 8.8–10.2)
CHLORIDE SERPL-SCNC: 104 MMOL/L (ref 98–107)
CREAT SERPL-MCNC: 1.04 MG/DL (ref 0.67–1.17)
DEPRECATED HCO3 PLAS-SCNC: 25 MMOL/L (ref 22–29)
ERYTHROCYTE [DISTWIDTH] IN BLOOD BY AUTOMATED COUNT: 12.2 % (ref 10–15)
GFR SERPL CREATININE-BSD FRML MDRD: 76 ML/MIN/1.73M2
GLUCOSE SERPL-MCNC: 105 MG/DL (ref 70–99)
HCT VFR BLD AUTO: 44.5 % (ref 40–53)
HGB BLD-MCNC: 15.1 G/DL (ref 13.3–17.7)
MCH RBC QN AUTO: 30.1 PG (ref 26.5–33)
MCHC RBC AUTO-ENTMCNC: 33.9 G/DL (ref 31.5–36.5)
MCV RBC AUTO: 89 FL (ref 78–100)
PLATELET # BLD AUTO: 235 10E3/UL (ref 150–450)
POTASSIUM SERPL-SCNC: 4.6 MMOL/L (ref 3.4–5.3)
PROT SERPL-MCNC: 7.6 G/DL (ref 6.4–8.3)
PSA SERPL DL<=0.01 NG/ML-MCNC: 1.9 NG/ML (ref 0–6.5)
RBC # BLD AUTO: 5.01 10E6/UL (ref 4.4–5.9)
SODIUM SERPL-SCNC: 141 MMOL/L (ref 136–145)
WBC # BLD AUTO: 7.2 10E3/UL (ref 4–11)

## 2023-08-11 PROCEDURE — G0103 PSA SCREENING: HCPCS | Performed by: PHYSICIAN ASSISTANT

## 2023-08-11 PROCEDURE — 99214 OFFICE O/P EST MOD 30 MIN: CPT | Mod: 25 | Performed by: PHYSICIAN ASSISTANT

## 2023-08-11 PROCEDURE — 36415 COLL VENOUS BLD VENIPUNCTURE: CPT | Performed by: PHYSICIAN ASSISTANT

## 2023-08-11 PROCEDURE — 85027 COMPLETE CBC AUTOMATED: CPT | Performed by: PHYSICIAN ASSISTANT

## 2023-08-11 PROCEDURE — 80053 COMPREHEN METABOLIC PANEL: CPT | Performed by: PHYSICIAN ASSISTANT

## 2023-08-11 PROCEDURE — G0439 PPPS, SUBSEQ VISIT: HCPCS | Performed by: PHYSICIAN ASSISTANT

## 2023-08-11 PROCEDURE — 82274 ASSAY TEST FOR BLOOD FECAL: CPT | Performed by: PHYSICIAN ASSISTANT

## 2023-08-11 RX ORDER — FLUOXETINE 10 MG/1
10 CAPSULE ORAL DAILY
Qty: 30 CAPSULE | Refills: 5 | Status: SHIPPED | OUTPATIENT
Start: 2023-08-11 | End: 2023-09-02

## 2023-08-11 ASSESSMENT — ENCOUNTER SYMPTOMS
COUGH: 0
HEMATOCHEZIA: 0
EYE PAIN: 0
NERVOUS/ANXIOUS: 1
FREQUENCY: 1
JOINT SWELLING: 0
ARTHRALGIAS: 0
MYALGIAS: 0
SHORTNESS OF BREATH: 0
DYSPHORIC MOOD: 0
CHILLS: 0
DIZZINESS: 0
DECREASED CONCENTRATION: 0
WEAKNESS: 0
HEMATURIA: 0
SLEEP DISTURBANCE: 0
HEARTBURN: 0
ABDOMINAL PAIN: 0
DYSURIA: 0
PARESTHESIAS: 0
FEVER: 0
HEADACHES: 0
DIARRHEA: 0
SORE THROAT: 0
CONSTIPATION: 0
NAUSEA: 0
PALPITATIONS: 0

## 2023-08-11 ASSESSMENT — ACTIVITIES OF DAILY LIVING (ADL): CURRENT_FUNCTION: NO ASSISTANCE NEEDED

## 2023-08-11 NOTE — PATIENT INSTRUCTIONS
Please call the radiology dep at [facility] to schedule your test today at [phone].

## 2023-08-11 NOTE — PROGRESS NOTES
"SUBJECTIVE:   Duane is a 73 year old who presents for Preventive Visit.      8/11/2023     9:12 AM   Additional Questions   Roomed by Martha Saleem   Accompanied by none       Are you in the first 12 months of your Medicare coverage?  No    Duane is a pleasant 73-year-old male who presents to the clinic today for annual physical.  Past medical history significant for hyperlipidemia, GERD, BPH, and seasonal allergies.  He is doing well.    Over the last 3 to 4 months he has been suffering from increased anxiety.  He has had increased stressors in his life including having COVID and the passing of his mother.  He states that he is more anxious than normal and his wife also notices this as well.  He is interested in starting on medications to see if this helps.    He is a former smoker quit 1987 and was smoking up to 2 packs a day.  He would like to do AAA screening    Currently on Protonix and pravastatin.  No side effects from the medication.    Healthy Habits:     In general, how would you rate your overall health?  Excellent    Frequency of exercise:  4-5 days/week    Duration of exercise:  30-45 minutes    Do you usually eat at least 4 servings of fruit and vegetables a day, include whole grains    & fiber and avoid regularly eating high fat or \"junk\" foods?  No    Taking medications regularly:  Yes    Medication side effects:  None    Ability to successfully perform activities of daily living:  No assistance needed    Home Safety:  No safety concerns identified    Hearing Impairment:  Difficulty following a conversation in a noisy restaurant or crowded room and difficulty understanding soft or whispered speech    In the past 6 months, have you been bothered by leaking of urine?  No    In general, how would you rate your overall mental or emotional health?  Good    Additional concerns today:  No        Have you ever done Advance Care Planning? (For example, a Health Directive, POLST, or a discussion with a medical " provider or your loved ones about your wishes): Yes, advance care planning is on file.    Fall risk  Fallen 2 or more times in the past year?: No  Any fall with injury in the past year?: No  click delete button to remove this line now  Cognitive Screening   1) Repeat 3 items (Leader, Season, Table)    2) Clock draw: NORMAL  3) 3 item recall: Recalls 3 objects  Results: 3 items recalled: COGNITIVE IMPAIRMENT LESS LIKELY    Mini-CogTM Copyright KATHERINE Cerda. Licensed by the author for use in [facility]; reprinted with permission (helen@[facility]). All rights reserved.      Do you have sleep apnea, excessive snoring or daytime drowsiness? : yes    Reviewed and updated as needed this visit by clinical staff   Tobacco  Allergies  Meds              Reviewed and updated as needed this visit by Provider                 Social History     Tobacco Use     Smoking status: Former     Packs/day: 2.00     Years: 17.00     Pack years: 34.00     Types: Cigarettes     Quit date:      Years since quittin.6     Passive exposure: Never     Smokeless tobacco: Never   Substance Use Topics     Alcohol use: Never             2023    11:24 AM   Alcohol Use   Prescreen: >3 drinks/day or >7 drinks/week? Not Applicable       Current providers sharing in care for this patient include:   Patient Care Team:  Mark Anthony Rudolph PA-C as PCP - General (Family Medicine)  Mark Anthony Rudolph PA-C as Assigned PCP    The following health maintenance items are reviewed in Epic and correct as of today:  Health Maintenance   Topic Date Due     ZOSTER IMMUNIZATION (2 of 3) 2013     AORTIC ANEURYSM SCREENING (SYSTEM ASSIGNED)  Never done     COVID-19 Vaccine (6 - Moderna series) 2023     COLORECTAL CANCER SCREENING  2023     ANNUAL REVIEW OF HM ORDERS  2023     INFLUENZA VACCINE (1) 2023     DTAP/TDAP/TD IMMUNIZATION (4 - Td or Tdap) 2023     MEDICARE ANNUAL WELLNESS VISIT  2024     FALL RISK  "ASSESSMENT  08/11/2024     LIPID  05/08/2028     ADVANCE CARE PLANNING  08/11/2028     HEPATITIS C SCREENING  Completed     PHQ-2 (once per calendar year)  Completed     Pneumococcal Vaccine: 65+ Years  Completed     IPV IMMUNIZATION  Aged Out     MENINGITIS IMMUNIZATION  Aged Out       Review of Systems   Constitutional:  Negative for chills and fever.   HENT:  Negative for congestion, ear pain, hearing loss and sore throat.    Eyes:  Negative for pain and visual disturbance.   Respiratory:  Negative for cough and shortness of breath.    Cardiovascular:  Negative for chest pain, palpitations and peripheral edema.   Gastrointestinal:  Negative for abdominal pain, constipation, diarrhea, heartburn, hematochezia and nausea.   Genitourinary:  Positive for frequency and impotence. Negative for dysuria, genital sores, hematuria, penile discharge and urgency.   Musculoskeletal:  Negative for arthralgias, joint swelling and myalgias.   Skin:  Negative for rash.   Neurological:  Negative for dizziness, weakness, headaches and paresthesias.   Psychiatric/Behavioral:  Negative for decreased concentration, dysphoric mood, mood changes, sleep disturbance and suicidal ideas. The patient is nervous/anxious.        OBJECTIVE:   BP (!) 150/62   Pulse 81   Temp 98.4  F (36.9  C) (Oral)   Resp 14   Ht 1.784 m (5' 10.25\")   Wt 102.5 kg (226 lb)   SpO2 99%   BMI 32.20 kg/m   Estimated body mass index is 32.2 kg/m  as calculated from the following:    Height as of this encounter: 1.784 m (5' 10.25\").    Weight as of this encounter: 102.5 kg (226 lb).    Physical Exam  Vitals and nursing note reviewed.   Constitutional:       General: He is not in acute distress.     Appearance: Normal appearance. He is well-developed and well-groomed. He is not ill-appearing or toxic-appearing.   HENT:      Head: Normocephalic and atraumatic.      Right Ear: Tympanic membrane, ear canal and external ear normal.      Left Ear: Tympanic membrane, " ear canal and external ear normal.      Mouth/Throat:      Lips: Pink.      Mouth: Mucous membranes are moist.      Palate: No mass.      Pharynx: Oropharynx is clear. Uvula midline.      Tonsils: No tonsillar exudate or tonsillar abscesses.   Eyes:      General: Lids are normal.         Right eye: No discharge.         Left eye: No discharge.   Neck:      Trachea: Trachea normal.   Cardiovascular:      Rate and Rhythm: Normal rate and regular rhythm.      Heart sounds: S1 normal and S2 normal. No murmur heard.  Pulmonary:      Effort: Pulmonary effort is normal.      Breath sounds: Normal breath sounds and air entry.   Abdominal:      General: Bowel sounds are normal. There is no distension.      Palpations: Abdomen is soft.      Tenderness: There is no abdominal tenderness. There is no guarding or rebound.   Musculoskeletal:      Cervical back: Full passive range of motion without pain and neck supple.      Right lower leg: No edema.      Left lower leg: No edema.   Lymphadenopathy:      Cervical: No cervical adenopathy.   Skin:     General: Skin is warm and dry.      Findings: No lesion or rash.   Neurological:      General: No focal deficit present.      Mental Status: He is alert.      Cranial Nerves: No cranial nerve deficit.      Gait: Gait is intact.      Deep Tendon Reflexes:      Reflex Scores:       Patellar reflexes are 2+ on the right side and 2+ on the left side.  Psychiatric:         Attention and Perception: Attention normal.         Mood and Affect: Mood normal.         Speech: Speech normal.         ASSESSMENT / PLAN:     Medicare annual wellness visit, subsequent  Update screening labs including a CBC, complete metabolic panel and PSA.  Immunizations was discussed today.  He is to update shingles vaccine at the local pharmacy.  He is not interested in COVID-vaccine at this time.  - CBC with platelets; Future  - Comprehensive metabolic panel (BMP + Alb, Alk Phos, ALT, AST, Total. Bili, TP);  Future  - CBC with platelets  - Comprehensive metabolic panel (BMP + Alb, Alk Phos, ALT, AST, Total. Bili, TP)    Essential Hypercholesterolemia  Last lipid panel was stable.  Continue with pravastatin 20 mg daily.    Recent Labs   Lab Test 05/08/23  0955 11/02/22  0832   CHOL 158 162   HDL 48 42   LDL 96 105*   TRIG 68 76     Seasonal allergic rhinitis due to pollen  Currently using Allegra symptoms are well-controlled.    Gastroesophageal reflux disease without esophagitis  Using Protonix as needed.  Symptoms well-controlled.    Benign prostatic hyperplasia without lower urinary tract symptoms  States that he is getting up 2-3 times at night.  No urinary head symptoms see or dribbling.  He states that he is drinking quite a bit right before bed.  I did recommend trying to cut out drinking 2 to 3 hours before bedtime.  He does not wish to start medication at this time and I think that this is reasonable.  If he continues to have nighttime awakening due to you having to go to the bathroom or he is having difficulty starting his stream he is to follow-up.    Anxiety  Increased life stressors.  Passing of his mother in June and having COVID has increased his anxiety.  He denies any depression symptoms but also states he has decreased motivation at times.  Discussed medication versus counseling and he would like to try a medication.  We will start him on Prozac 10 mg daily.  Side effects of the medication were discussed.  He is to follow-up in 4 to 6 weeks if he has no improvement from medications.  - FLUoxetine (PROZAC) 10 MG capsule; Take 1 capsule (10 mg) by mouth daily    Blood Pressure Isolated Elevated  Whitecoat syndrome.  He does check his blood pressures at home.  Blood pressure today was 130/70.  Recheck blood pressure did come down to 152/62.  He is to continue to monitor blood pressures at home.    Screen for colon cancer  He does not wish to do colonoscopy at this time but will do fit test.  If it test  come back positive we will move forward with a colonoscopy at that time.  He is otherwise asymptomatic at this time.  - Fecal colorectal cancer screen FIT - Future (S+30); Future  - Fecal colorectal cancer screen FIT - Future (S+30)    Encounter for abdominal aortic aneurysm (AAA) screening  Former smoker.  Since quit 17 years ago.  Was smoking up to 2 packs a day.  - [place] A[place] Medicare AAA Screening; Future      Screening for prostate cancer  We will check PSA level today.  Last PSA level is stable at 1.47.  - PSA, screen; Future  - PSA, screen     Follow-up Visit   Expected date:  Aug 11, 2024 (Approximate)      Follow Up Appointment Details:     Follow-up with whom?: Me    Follow-Up for what?: Medicare Wellness    Any Additional Chronic Condition Management?: General (Other)    Welcome or Annual?: Annual Wellness    How?: In Person                     Current Outpatient Medications   Medication     clobetasol (TEMOVATE) 0.05 % external cream     fexofenadine (ALLEGRA) 180 MG tablet     Fluocinolone Acetonide Scalp 0.01 % OIL oil     FLUoxetine (PROZAC) 10 MG capsule     ketoconazole (NIZORAL) 2 % external shampoo     mupirocin (BACTROBAN) 2 % external ointment     neomycin-polymyxin-hydrocortisone (CORTISPORIN) 3.5-11398-8 otic solution     pantoprazole (PROTONIX) 40 MG EC tablet     pravastatin (PRAVACHOL) 20 MG tablet     No current facility-administered medications for this visit.       Patient has been advised of split billing requirements and indicates understanding: Yes      COUNSELING:  Reviewed preventive health counseling, as reflected in patient instructions       Regular exercise       Healthy diet/nutrition       Vision screening       Colon cancer screening       Prostate cancer screening      He reports that he quit smoking about 36 years ago. His smoking use included cigarettes. He has a 34.00 pack-year smoking history. He has never been exposed to tobacco smoke. He has never used smokeless  tobacco.      Appropriate preventive services were discussed with this patient, including applicable screening as appropriate for cardiovascular disease, diabetes, osteopenia/osteoporosis, and glaucoma.  As appropriate for age/gender, discussed screening for colorectal cancer, prostate cancer, breast cancer, and cervical cancer. Checklist reviewing preventive services available has been given to the patient.    Reviewed patients plan of care and provided an AVS. The Basic Care Plan (routine screening as documented in Health Maintenance) for Duane meets the Care Plan requirement. This Care Plan has been established and reviewed with the Patient.          Mark Anthony Rudolph PA-C  [facility]

## 2023-08-14 LAB — HEMOCCULT STL QL IA: NEGATIVE

## 2023-09-02 DIAGNOSIS — F41.9 ANXIETY: ICD-10-CM

## 2023-09-02 RX ORDER — FLUOXETINE 10 MG/1
10 CAPSULE ORAL DAILY
Qty: 90 CAPSULE | Refills: 3 | Status: SHIPPED | OUTPATIENT
Start: 2023-09-02 | End: 2024-08-19

## 2023-09-02 NOTE — TELEPHONE ENCOUNTER
"Last Written Prescription Date:  8/11/23  Last Fill Quantity: 30,  # refills: 5   Last office visit provider:  8/11/23     Requested Prescriptions   Pending Prescriptions Disp Refills    FLUoxetine (PROZAC) 10 MG capsule [Pharmacy Med Name: FLUOXETINE HCL 10 MG CAPSULE] 30 capsule 5     Sig: TAKE 1 CAPSULE BY MOUTH EVERY DAY       SSRIs Protocol Passed - 9/2/2023  9:31 AM        Passed - Recent (12 mo) or future (30 days) visit within the authorizing provider's specialty     Patient has had an office visit with the authorizing provider or a provider within the authorizing providers department within the previous 12 mos or has a future within next 30 days. See \"Patient Info\" tab in inbasket, or \"Choose Columns\" in Meds & Orders section of the refill encounter.              Passed - Medication is active on med list        Passed - Patient is age 18 or older             Bella Parekh 09/02/23 4:01 PM  "

## 2024-04-21 DIAGNOSIS — E78.00 PURE HYPERCHOLESTEROLEMIA: ICD-10-CM

## 2024-04-22 RX ORDER — PRAVASTATIN SODIUM 20 MG
20 TABLET ORAL DAILY
Qty: 90 TABLET | Refills: 0 | Status: SHIPPED | OUTPATIENT
Start: 2024-04-22 | End: 2024-07-19

## 2024-07-19 DIAGNOSIS — E78.00 PURE HYPERCHOLESTEROLEMIA: ICD-10-CM

## 2024-07-19 RX ORDER — PRAVASTATIN SODIUM 20 MG
20 TABLET ORAL DAILY
Qty: 90 TABLET | Refills: 3 | Status: SHIPPED | OUTPATIENT
Start: 2024-07-19

## 2024-08-07 ENCOUNTER — MYC MEDICAL ADVICE (OUTPATIENT)
Dept: FAMILY MEDICINE | Facility: CLINIC | Age: 74
End: 2024-08-07
Payer: COMMERCIAL

## 2024-08-08 ENCOUNTER — NURSE TRIAGE (OUTPATIENT)
Dept: FAMILY MEDICINE | Facility: CLINIC | Age: 74
End: 2024-08-08
Payer: COMMERCIAL

## 2024-08-08 NOTE — TELEPHONE ENCOUNTER
"Nurse Triage SBAR    Is this a 2nd Level Triage? NO    Situation: BitGot message:   \"after lifting weights at the gym recently, I noticed a small bump by my navel. There's no color change or pain or discomfort. I have an upcoming appt 8/23. Should I come in before then or is it fine to wait.\"    Background: Patient last seen by PCP on 8/11/23.     Assessment: Patient reports round \"bump\" about the size of a quarter right above navel.   Reports he noticed it about a week ago.   States it comes and goes. Can't feel it or see it when laying down on back, but can feel it and see it when standing.     States he has had more \"gas\" than normal, but no other symptoms.     Denies pain or tenderness, abdominal pain, fever, vomiting, redness or discoloration.     Protocol Recommended Disposition:   See in Office Within 3 Days    Recommendation: Patient scheduled for soonest available office visit on 8/12/24. Routing to provider to approve appointment or provider other recommendations.     Advised patient to call back with new or worsening symptoms such as pain or tenderness in the meantime or to go to urgent care. Patient verbalized understanding and is in agreement with plan.     Routed to provider    Does the patient meet one of the following criteria for ADS visit consideration? 16+ years old, with an [facility] PCP     Reason for Disposition   New-onset hernia suspected (reducible bulge in groin or abdomen; non-tender) and NO pain or vomiting    Additional Information   Negative: Swelling of scrotum and has not previously been diagnosed with a hernia   Negative: SEVERE abdominal pain   Negative: Hernia is painful or tender to touch   Negative: Vomiting and can't reduce the hernia   Negative: Vomiting and abdomen looks much more swollen than usual   Negative: Vomiting and hernia is more painful or swollen than usual   Negative: Swollen lump in groin and pulsating (like heartbeat)   Negative: Patient sounds very sick or weak to " "the triager   Negative: Constant abdominal pain and present > 2 hours (NO pain or tenderness of hernia)   Negative: Can't reduce the hernia (NO pain, local tenderness, or vomiting)   Negative: Patient wants to be seen    Answer Assessment - Initial Assessment Questions  1. ONSET:  \"When did this first appear?\"      A week ago  2. APPEARANCE: \"What does it look like?\"      Top of navel- looks like a bump  3. SIZE: \"How big is it?\" (inches, cm or compare to coins, fruit)      As big as a quarter, round   4. LOCATION: \"Where exactly is the hernia located?\"      Right above navel   5. PATTERN: \"Does the swelling come and go, or has it been constant since it started?\"      When laying down on back, don't feel anything. When stand up can feel it.   6. PAIN: \"Is there any pain?\" If Yes, ask: \"How bad is it?\"  (Scale 1-10; or mild, moderate, severe)     - MILD (1-3): Doesn't interfere with normal activities, abdomen soft and not tender to touch.      - MODERATE (4-7): Interferes with normal activities or awakens from sleep, abdomen tender to touch.      - SEVERE (8-10): Excruciating pain, doubled over, unable to do any normal activities.        No pain  7. DIAGNOSIS: \"Have you been seen by a doctor (or NP/PA) for this?\" \"Did the doctor diagnose you as having a hernia?\"      No  8. OTHER SYMPTOMS: \"Do you have any other symptoms?\" (e.g., fever, abdomen pain, vomiting)      More gas than normal, but nothing else  9. PREGNANCY: \"Is there any chance you are pregnant?\" \"When was your last menstrual period?\"      N/a    Protocols used: Hernia-A-OH    Jessica Mckeon RN  [facility]  "

## 2024-08-09 ENCOUNTER — TELEPHONE (OUTPATIENT)
Dept: FAMILY MEDICINE | Facility: CLINIC | Age: 74
End: 2024-08-09
Payer: COMMERCIAL

## 2024-08-09 NOTE — TELEPHONE ENCOUNTER
Patient called into the clinic to schedule an appointment with the PCP sooner than his annual physical appointment, on 8/23/24, with the PCP, due to a possible abdominal hernia-see nurse triage from 8/8/24 regarding hernia.    Writer assisted the patient with scheduling an appointment with the PCP, on 8/19/24, Monday, at 1:30 pm, to discuss possible abdominal hernia repair.    Patient also elected to keep the appointment he has scheduled with the PCP, on 8/23/24, at 9:30 am, for the patient's annual physical exam.    Denies other questions or concerns at this time.    Cuca Dowling, RN, BSN  [facility]

## 2024-08-09 NOTE — TELEPHONE ENCOUNTER
Spoke to patient and relayed message from provider.  Patient verbalized understanding and agrees with plan.  Cancelled appointment for next week.    JOLENE Rose RN  [facility]

## 2024-08-18 SDOH — HEALTH STABILITY: PHYSICAL HEALTH: ON AVERAGE, HOW MANY DAYS PER WEEK DO YOU ENGAGE IN MODERATE TO STRENUOUS EXERCISE (LIKE A BRISK WALK)?: 6 DAYS

## 2024-08-18 SDOH — HEALTH STABILITY: PHYSICAL HEALTH: ON AVERAGE, HOW MANY MINUTES DO YOU ENGAGE IN EXERCISE AT THIS LEVEL?: 30 MIN

## 2024-08-18 ASSESSMENT — SOCIAL DETERMINANTS OF HEALTH (SDOH): HOW OFTEN DO YOU GET TOGETHER WITH FRIENDS OR RELATIVES?: MORE THAN THREE TIMES A WEEK

## 2024-08-19 ENCOUNTER — OFFICE VISIT (OUTPATIENT)
Dept: FAMILY MEDICINE | Facility: CLINIC | Age: 74
End: 2024-08-19
Payer: COMMERCIAL

## 2024-08-19 VITALS
HEIGHT: 70 IN | WEIGHT: 227 LBS | RESPIRATION RATE: 14 BRPM | BODY MASS INDEX: 32.5 KG/M2 | HEART RATE: 79 BPM | DIASTOLIC BLOOD PRESSURE: 70 MMHG | OXYGEN SATURATION: 99 % | TEMPERATURE: 98.2 F | SYSTOLIC BLOOD PRESSURE: 144 MMHG

## 2024-08-19 DIAGNOSIS — R03.0 ELEVATED BLOOD PRESSURE READING WITHOUT DIAGNOSIS OF HYPERTENSION: ICD-10-CM

## 2024-08-19 DIAGNOSIS — Z12.5 SCREENING FOR PROSTATE CANCER: ICD-10-CM

## 2024-08-19 DIAGNOSIS — Z00.00 MEDICARE ANNUAL WELLNESS VISIT, SUBSEQUENT: Primary | ICD-10-CM

## 2024-08-19 DIAGNOSIS — E78.00 PURE HYPERCHOLESTEROLEMIA: ICD-10-CM

## 2024-08-19 DIAGNOSIS — L20.9 ATOPIC DERMATITIS, UNSPECIFIED TYPE: ICD-10-CM

## 2024-08-19 DIAGNOSIS — F41.9 ANXIETY: ICD-10-CM

## 2024-08-19 DIAGNOSIS — K42.9 UMBILICAL HERNIA WITHOUT OBSTRUCTION AND WITHOUT GANGRENE: ICD-10-CM

## 2024-08-19 DIAGNOSIS — K21.9 GASTROESOPHAGEAL REFLUX DISEASE WITHOUT ESOPHAGITIS: ICD-10-CM

## 2024-08-19 DIAGNOSIS — Z12.11 SCREEN FOR COLON CANCER: ICD-10-CM

## 2024-08-19 DIAGNOSIS — N40.0 BENIGN PROSTATIC HYPERPLASIA WITHOUT LOWER URINARY TRACT SYMPTOMS: ICD-10-CM

## 2024-08-19 DIAGNOSIS — Z13.6 ENCOUNTER FOR ABDOMINAL AORTIC ANEURYSM (AAA) SCREENING: ICD-10-CM

## 2024-08-19 LAB
ALBUMIN SERPL BCG-MCNC: 4.4 G/DL (ref 3.5–5.2)
ALP SERPL-CCNC: 65 U/L (ref 40–150)
ALT SERPL W P-5'-P-CCNC: 11 U/L (ref 0–70)
ANION GAP SERPL CALCULATED.3IONS-SCNC: 11 MMOL/L (ref 7–15)
AST SERPL W P-5'-P-CCNC: 18 U/L (ref 0–45)
BILIRUB SERPL-MCNC: 1.5 MG/DL
BUN SERPL-MCNC: 18.5 MG/DL (ref 8–23)
CALCIUM SERPL-MCNC: 9.4 MG/DL (ref 8.8–10.4)
CHLORIDE SERPL-SCNC: 103 MMOL/L (ref 98–107)
CHOLEST SERPL-MCNC: 156 MG/DL
CREAT SERPL-MCNC: 1.18 MG/DL (ref 0.67–1.17)
EGFRCR SERPLBLD CKD-EPI 2021: 65 ML/MIN/1.73M2
ERYTHROCYTE [DISTWIDTH] IN BLOOD BY AUTOMATED COUNT: 11.8 % (ref 10–15)
FASTING STATUS PATIENT QL REPORTED: YES
FASTING STATUS PATIENT QL REPORTED: YES
GLUCOSE SERPL-MCNC: 96 MG/DL (ref 70–99)
HCO3 SERPL-SCNC: 26 MMOL/L (ref 22–29)
HCT VFR BLD AUTO: 44.7 % (ref 40–53)
HDLC SERPL-MCNC: 45 MG/DL
HGB BLD-MCNC: 15.1 G/DL (ref 13.3–17.7)
LDLC SERPL CALC-MCNC: 97 MG/DL
MCH RBC QN AUTO: 29.9 PG (ref 26.5–33)
MCHC RBC AUTO-ENTMCNC: 33.8 G/DL (ref 31.5–36.5)
MCV RBC AUTO: 89 FL (ref 78–100)
NONHDLC SERPL-MCNC: 111 MG/DL
PLATELET # BLD AUTO: 222 10E3/UL (ref 150–450)
POTASSIUM SERPL-SCNC: 4.4 MMOL/L (ref 3.4–5.3)
PROT SERPL-MCNC: 7.3 G/DL (ref 6.4–8.3)
PSA SERPL DL<=0.01 NG/ML-MCNC: 1.64 NG/ML (ref 0–6.5)
RBC # BLD AUTO: 5.05 10E6/UL (ref 4.4–5.9)
SODIUM SERPL-SCNC: 140 MMOL/L (ref 135–145)
TRIGL SERPL-MCNC: 72 MG/DL
WBC # BLD AUTO: 7.7 10E3/UL (ref 4–11)

## 2024-08-19 PROCEDURE — 85027 COMPLETE CBC AUTOMATED: CPT | Performed by: PHYSICIAN ASSISTANT

## 2024-08-19 PROCEDURE — 80053 COMPREHEN METABOLIC PANEL: CPT | Performed by: PHYSICIAN ASSISTANT

## 2024-08-19 PROCEDURE — 99214 OFFICE O/P EST MOD 30 MIN: CPT | Mod: 25 | Performed by: PHYSICIAN ASSISTANT

## 2024-08-19 PROCEDURE — G0439 PPPS, SUBSEQ VISIT: HCPCS | Performed by: PHYSICIAN ASSISTANT

## 2024-08-19 PROCEDURE — G0103 PSA SCREENING: HCPCS | Performed by: PHYSICIAN ASSISTANT

## 2024-08-19 PROCEDURE — 80061 LIPID PANEL: CPT | Performed by: PHYSICIAN ASSISTANT

## 2024-08-19 PROCEDURE — 36415 COLL VENOUS BLD VENIPUNCTURE: CPT | Performed by: PHYSICIAN ASSISTANT

## 2024-08-19 RX ORDER — MUPIROCIN 20 MG/G
OINTMENT TOPICAL 3 TIMES DAILY
Qty: 30 G | Refills: 11 | Status: SHIPPED | OUTPATIENT
Start: 2024-08-19

## 2024-08-19 RX ORDER — PANTOPRAZOLE SODIUM 40 MG/1
40 TABLET, DELAYED RELEASE ORAL DAILY
Qty: 90 TABLET | Refills: 3 | Status: SHIPPED | OUTPATIENT
Start: 2024-08-19

## 2024-08-19 NOTE — PROGRESS NOTES
Preventive Care Visit  [facility]  Mark Anthony Rudolph PA-C, Family Medicine  Aug 19, 2024      Assessment & Plan     Medicare annual wellness visit, subsequent  Overall doing well.  Will update vaccines at the local pharmacy.  Will update labs  - CBC with platelets; Future  - CBC with platelets    Essential Hypercholesterolemia  Continue with pravastatin 20 mg daily.  No side effects of the medication.  Will check lipid panel today  - Lipid panel reflex to direct LDL Fasting; Future  - Lipid panel reflex to direct LDL Fasting    Blood Pressure Isolated Elevated  Pressure borderline at 144/70.  Blood pressure is down from last visit.  He has gained some weight so we did discuss increasing activity to help with lowering blood pressure.  We also discussed diet and activity.  Will hold off on blood pressure medications at this time.  - Comprehensive metabolic panel (BMP + Alb, Alk Phos, ALT, AST, Total. Bili, TP); Future  - Comprehensive metabolic panel (BMP + Alb, Alk Phos, ALT, AST, Total. Bili, TP)    Anxiety  Discontinue Prozac.  Feels that things are going well without it.    Gastroesophageal reflux disease without esophagitis  Continue with Protonix.  - pantoprazole (PROTONIX) 40 MG EC tablet; Take 1 tablet (40 mg) by mouth daily    Benign prostatic hyperplasia without lower urinary tract symptoms  Stable at this time.    Screening for prostate cancer  - PSA, screen; Future  - PSA, screen    Screen for colon cancer  - Fecal colorectal cancer screen FIT - Future (S+30); Future  - Fecal colorectal cancer screen FIT - Future (S+30)    Atopic dermatitis, unspecified type  Refilled Bactroban to use as needed  - mupirocin (BACTROBAN) 2 % external ointment; Apply topically 3 times daily    Umbilical hernia without obstruction and without gangrene  On exam he does have a small umbilical hernia.  Nontender and reducible.  No concern for strangulation or incarceration.  He would like to meet with  "general surgery to discuss observation versus surgical management.  - Adult Gen Surg  Referral; Future    Encounter for abdominal aortic aneurysm (AAA) screening  AAA screening ordered.  -  Aorta Medicare AAA Screening; Future     Follow-up Visit   Expected date:  Aug 19, 2025 (Approximate)      Follow Up Appointment Details:     Follow-up with whom?: Me    Follow-Up for what?: Medicare Wellness    Welcome or Annual?: Welcome to Medicare    How?: In Person                     Current Outpatient Medications   Medication Sig Dispense Refill    clobetasol (TEMOVATE) 0.05 % external cream [CLOBETASOL (TEMOVATE) 0.05 % CREAM] Apply to affected areas BID 30 g 3    fexofenadine (ALLEGRA) 180 MG tablet [FEXOFENADINE (ALLEGRA) 180 MG TABLET] Take 1 tablet (180 mg total) by mouth daily. 90 tablet 1    Fluocinolone Acetonide Scalp 0.01 % OIL oil       ketoconazole (NIZORAL) 2 % external shampoo SHAMPOO ONCE DAILY FOR 2 WEEKS, THEN TWICE A WEEK      mupirocin (BACTROBAN) 2 % external ointment Apply topically 3 times daily 30 g 11    neomycin-polymyxin-hydrocortisone (CORTISPORIN) 3.5-42994-4 otic solution Place 3 drops in ear(s) 4 times daily 10 mL 0    pantoprazole (PROTONIX) 40 MG EC tablet Take 1 tablet (40 mg) by mouth daily 90 tablet 3    pravastatin (PRAVACHOL) 20 MG tablet TAKE 1 TABLET BY MOUTH EVERY DAY 90 tablet 3     No current facility-administered medications for this visit.               BMI  Estimated body mass index is 32.57 kg/m  as calculated from the following:    Height as of this encounter: 1.778 m (5' 10\").    Weight as of this encounter: 103 kg (227 lb).   Weight management plan: Discussed healthy diet and exercise guidelines        Subjective Duane is a 74 year old, presenting for the following:  Annual Visit (Fasting Annual Medicare Wellness. Pt would also like to discuss possible hernia.)        8/19/2024     1:01 PM   Additional Questions   Roomed by Martha Saleem   Accompanied by none "         Health Care Directive  Patient does not have a Health Care Directive or Living Will: Discussed advance care planning with patient; however, patient declined at this time.    Duane is a pleasant 74-year-old male who presents to the clinic today for annual physical.  Overall he is doing good.  No questions or regarding his chronic health.  He continues on pravastatin and omeprazole.  Discontinue Prozac as he feels that his mood is doing well.    The last 3 weeks he has noticed a lump in his bellybutton.  He states that he was working out and noticed the lump.  It is nontender.  No injury that he can think of.    History of Present Illness       Reason for visit:  Possible umbelical hernia  Symptom onset:  3-4 weeks ago  Symptoms include:  Small bump above my navel  Symptom intensity:  Mild  Symptom progression:  Staying the same  Had these symptoms before:  No    He eats 4 or more servings of fruits and vegetables daily.He consumes 0 sweetened beverage(s) daily.He exercises with enough effort to increase his heart rate 30 to 60 minutes per day.  He exercises with enough effort to increase his heart rate 4 days per week.   He is taking medications regularly.             8/18/2024   General Health    Good    Only a little      (!) STRESS CONCERN      8/18/2024   Nutrition   Diet: I don't know            8/18/2024   Exercise   Days per week of moderate/strenous exercise 6 days   Average minutes spent exercising at this level 30 min            8/18/2024   Social Factors   Frequency of gathering with friends or relatives More than three times a week   Worry food won't last until get money to buy more No   Food not last or not have enough money for food? No   Do you have housing? (Housing is defined as stable permanent housing and does not include staying ouside in a car, in a tent, in an abandoned building, in an overnight shelter, or couch-surfing.) Yes   Are you worried about losing your housing? No   Lack of  transportation? No   Unable to get utilities (heat,electricity)? No            2024   Fall Risk   Fallen 2 or more times in the past year? No   Trouble with walking or balance? No             2024   Activities of Daily Living- Home Safety   Needs help with the following daily activites None of the above   Safety concerns in the home None of the above            2024   Dental   Dentist two times every year? Yes            2024   Hearing Screening   Hearing concerns? None of the above            2024   Driving Risk Screening   Patient/family members have concerns about driving No            2024   General Alertness/Fatigue Screening   Have you been more tired than usual lately? No            2024   Urinary Incontinence Screening   Bothered by leaking urine in past 6 months No            2024   TB Screening   Were you born outside of the [place]? No            Today's PHQ-2 Score:       2024     1:44 PM   PHQ-2 (  [employer])   Q1: Little interest or pleasure in doing things 0   Q2: Feeling down, depressed or hopeless 0   PHQ-2 Score 0   Q1: Little interest or pleasure in doing things Not at all   Q2: Feeling down, depressed or hopeless Not at all   PHQ-2 Score 0           2024   Substance Use   Alcohol more than 3/day or more than 7/wk No   Do you have a current opioid prescription? No   How severe/bad is pain from 1 to 10? 0/10 (No Pain)   Do you use any other substances recreationally? No        Social History     Tobacco Use    Smoking status: Former     Current packs/day: 0.00     Average packs/day: 2.0 packs/day for 17.0 years (34.0 ttl pk-yrs)     Types: Cigarettes     Start date:      Quit date:      Years since quittin.6     Passive exposure: Never    Smokeless tobacco: Never   Vaping Use    Vaping status: Never Used   Substance Use Topics    Alcohol use: Never    Drug use: Never           2024   AAA Screening   Family history of Abdominal Aortic  Aneurysm (AAA)? No      ASCVD Risk   The 10-year ASCVD risk score (Cr MAZA, et al., 2019) is: 26.1%    Values used to calculate the score:      Age: 74 years      Sex: Male      Is Non- : No      Diabetic: No      Tobacco smoker: No      Systolic Blood Pressure: 144 mmHg      Is BP treated: No      HDL Cholesterol: 48 mg/dL      Total Cholesterol: 158 mg/dL            Reviewed and updated as needed this visit by Provider                    Past Medical History:   Diagnosis Date    Allergic rhinitis, cause unspecified     GERD (gastroesophageal reflux disease)     Hypertrophy of prostate without urinary obstruction and other lower urinary tract symptoms (LUTS)     Lumbago     Pure hypercholesterolemia      Past Surgical History:   Procedure Laterality Date    MOUTH SURGERY       Current providers sharing in care for this patient include:  Patient Care Team:  Mark Anthony Rudolph PA-C as PCP - General (Family Medicine)  Mark Anthony Rudolph PA-C as Assigned PCP    The following health maintenance items are reviewed in Epic and correct as of today:  Health Maintenance   Topic Date Due    ZOSTER IMMUNIZATION (2 of 3) 02/01/2013    AORTIC ANEURYSM SCREENING (SYSTEM ASSIGNED)  Never done    ANNUAL REVIEW OF HM ORDERS  08/09/2023    DTAP/TDAP/TD IMMUNIZATION (4 - Td or Tdap) 12/29/2023    COVID-19 Vaccine (7 - 2023-24 season) 02/25/2024    LIPID  05/08/2024    COLORECTAL CANCER SCREENING  08/11/2024    INFLUENZA VACCINE (1) 09/01/2024    MEDICARE ANNUAL WELLNESS VISIT  08/19/2025    FALL RISK ASSESSMENT  08/19/2025    GLUCOSE  08/11/2026    ADVANCE CARE PLANNING  08/19/2029    HEPATITIS C SCREENING  Completed    PHQ-2 (once per calendar year)  Completed    Pneumococcal Vaccine: 65+ Years  Completed    RSV VACCINE (Pregnancy & 60+)  Completed    IPV IMMUNIZATION  Aged Out    HPV IMMUNIZATION  Aged Out    MENINGITIS IMMUNIZATION  Aged Out    RSV MONOCLONAL ANTIBODY  Aged Out         Review of  "Systems       Objective    Exam  BP (!) 144/70 (BP Location: Right arm, Patient Position: Sitting, Cuff Size: Adult Regular)   Pulse 79   Temp 98.2  F (36.8  C) (Oral)   Resp 14   Ht 1.778 m (5' 10\")   Wt 103 kg (227 lb)   SpO2 99%   BMI 32.57 kg/m     Estimated body mass index is 32.57 kg/m  as calculated from the following:    Height as of this encounter: 1.778 m (5' 10\").    Weight as of this encounter: 103 kg (227 lb).    Physical Exam  Vitals and nursing note reviewed.   Constitutional:       General: He is not in acute distress.     Appearance: Normal appearance. He is well-developed and well-groomed. He is not ill-appearing or toxic-appearing.   HENT:      Head: Normocephalic and atraumatic.      Right Ear: Tympanic membrane, ear canal and external ear normal.      Left Ear: Tympanic membrane, ear canal and external ear normal.      Mouth/Throat:      Lips: Pink.      Mouth: Mucous membranes are moist.      Palate: No mass.      Pharynx: Oropharynx is clear. Uvula midline.      Tonsils: No tonsillar exudate or tonsillar abscesses.   Eyes:      General: Lids are normal.         Right eye: No discharge.         Left eye: No discharge.   Neck:      Trachea: Trachea normal.   Cardiovascular:      Rate and Rhythm: Normal rate and regular rhythm.      Heart sounds: S1 normal and S2 normal. No murmur heard.  Pulmonary:      Effort: Pulmonary effort is normal.      Breath sounds: Normal breath sounds and air entry.   Abdominal:      General: Bowel sounds are normal. There is no distension.      Palpations: Abdomen is soft.      Tenderness: There is no abdominal tenderness. There is no guarding or rebound.      Hernia: A hernia is present. Hernia is present in the umbilical area.   Musculoskeletal:      Cervical back: Full passive range of motion without pain and neck supple.      Right lower leg: No edema.      Left lower leg: No edema.   Lymphadenopathy:      Cervical: No cervical adenopathy.   Skin:     " General: Skin is warm and dry.      Findings: No lesion or rash.   Neurological:      General: No focal deficit present.      Mental Status: He is alert.      Cranial Nerves: No cranial nerve deficit.      Gait: Gait is intact.      Deep Tendon Reflexes:      Reflex Scores:       Patellar reflexes are 2+ on the right side and 2+ on the left side.  Psychiatric:         Attention and Perception: Attention normal.         Mood and Affect: Mood normal.         Speech: Speech normal.             8/19/2024   Mini Cog   Clock Draw Score 2 Normal   3 Item Recall 3 objects recalled   Mini Cog Total Score 5                 Signed Electronically by: Mark Anthony Rudolph PA-C

## 2024-08-20 PROCEDURE — 82274 ASSAY TEST FOR BLOOD FECAL: CPT | Performed by: PHYSICIAN ASSISTANT

## 2024-08-21 ENCOUNTER — OFFICE VISIT (OUTPATIENT)
Dept: SURGERY | Facility: CLINIC | Age: 74
End: 2024-08-21
Attending: PHYSICIAN ASSISTANT
Payer: COMMERCIAL

## 2024-08-21 ENCOUNTER — TELEPHONE (OUTPATIENT)
Dept: SURGERY | Facility: CLINIC | Age: 74
End: 2024-08-21

## 2024-08-21 VITALS
DIASTOLIC BLOOD PRESSURE: 122 MMHG | SYSTOLIC BLOOD PRESSURE: 167 MMHG | BODY MASS INDEX: 32.5 KG/M2 | HEART RATE: 114 BPM | WEIGHT: 227 LBS | HEIGHT: 70 IN

## 2024-08-21 DIAGNOSIS — K42.9 UMBILICAL HERNIA WITHOUT OBSTRUCTION AND WITHOUT GANGRENE: ICD-10-CM

## 2024-08-21 LAB — HEMOCCULT STL QL IA: NEGATIVE

## 2024-08-21 PROCEDURE — 99203 OFFICE O/P NEW LOW 30 MIN: CPT | Performed by: SURGERY

## 2024-08-21 NOTE — LETTER
8/21/2024      Duane E Miller  [address] [place] [id number]      Dear Colleague,    Thank you for referring your patient, Duane E Miller, to the [facility] [place]. Please see a copy of my visit note below.    HPI:  Duane E Miller is a 74 year old male who was referred to me by Mark Anthony Rudolph for evaluation of an umbilical hernia.  This is been present only for a few weeks.  It happened when the patient was lifting weights.  He is very active.  He is never had surgery before he is generally quite healthy.  He had a routine physical just earlier this week and everything came back normal.  He mainly has a bulge at his umbilicus.  Minimal pain.    Allergies:Amoxicillin-pot clavulanate [amoxicillin-pot clavulanate], Atorvastatin, Benzonatate, Paxlovid [nirmatrelvir-ritonavir], Simvastatin, and Tamiflu [oseltamivir]    Past Medical History:   Diagnosis Date     Allergic rhinitis, cause unspecified      GERD (gastroesophageal reflux disease)      Hypertrophy of prostate without urinary obstruction and other lower urinary tract symptoms (LUTS)      Lumbago      Pure hypercholesterolemia        Past Surgical History:   Procedure Laterality Date     MOUTH SURGERY         Current Outpatient Medications   Medication Sig Dispense Refill     clobetasol (TEMOVATE) 0.05 % external cream [CLOBETASOL (TEMOVATE) 0.05 % CREAM] Apply to affected areas BID 30 g 3     fexofenadine (ALLEGRA) 180 MG tablet [FEXOFENADINE (ALLEGRA) 180 MG TABLET] Take 1 tablet (180 mg total) by mouth daily. 90 tablet 1     Fluocinolone Acetonide Scalp 0.01 % OIL oil        ketoconazole (NIZORAL) 2 % external shampoo SHAMPOO ONCE DAILY FOR 2 WEEKS, THEN TWICE A WEEK       mupirocin (BACTROBAN) 2 % external ointment Apply topically 3 times daily 30 g 11     neomycin-polymyxin-hydrocortisone (CORTISPORIN) 3.5-72063-4 otic solution Place 3 drops in ear(s) 4 times daily 10 mL 0     pantoprazole (PROTONIX) 40  "MG EC tablet Take 1 tablet (40 mg) by mouth daily 90 tablet 3     pravastatin (PRAVACHOL) 20 MG tablet TAKE 1 TABLET BY MOUTH EVERY DAY 90 tablet 3       Family History   Problem Relation Age of Onset     Leukemia Father      Breast Cancer Mother      Hypertension Mother      Arthritis Mother      Neuropathy Mother      Brain Cancer Paternal Grandfather      Asthma Daughter      Asthma Son         reports that he quit smoking about 37 years ago. His smoking use included cigarettes. He started smoking about 54 years ago. He has a 34 pack-year smoking history. He has never been exposed to tobacco smoke. He has never used smokeless tobacco. He reports that he does not drink alcohol and does not use drugs.      BP (!) 167/122   Pulse 114   Ht 1.778 m (5' 10\")   Wt 103 kg (227 lb)   BMI 32.57 kg/m    Body mass index is 32.57 kg/m .    EXAM:  GENERAL: Well developed male in NAD  CV: RRR  PULM: Lungs clear to auscultation bilaterally  ABDOMEN: Soft and nondistended.  Diastases of the upper abdomen.  Small reducible umbilical hernia that is minimally tender.  I suspect the fascial defect is about 1 cm.  NEURO: No obvious deficits noted.  EXT: No edema, no obvious deformities or any other abnormalities    Assessment/Plan:    Duane E Miller is a 74 year old male presenting with a small umbilical hernia.  We discussed the pathophysiology of this condition and treatment with open umbilical herniorrhaphy plus or minus mesh.  We discussed the risks of bleeding, infection, and injury to other structures.  We discussed expected postoperative recovery.  The patient would like to proceed soon as possible.  Since he just had a physical and is healthy, he does not need further routine preop clearance.         Dada Calderon MD  General Surgeon  [facility]  Surgery [address] [address]?  Office: [phone]      Again, thank you for allowing me to " participate in the care of your patient.        Sincerely,        Dada Calderon MD

## 2024-08-21 NOTE — PROGRESS NOTES
HPI:  Duane E Miller is a 74 year old male who was referred to me by Mark Anthony Rudolph for evaluation of an umbilical hernia.  This is been present only for a few weeks.  It happened when the patient was lifting weights.  He is very active.  He is never had surgery before he is generally quite healthy.  He had a routine physical just earlier this week and everything came back normal.  He mainly has a bulge at his umbilicus.  Minimal pain.    Allergies:Amoxicillin-pot clavulanate [amoxicillin-pot clavulanate], Atorvastatin, Benzonatate, Paxlovid [nirmatrelvir-ritonavir], Simvastatin, and Tamiflu [oseltamivir]    Past Medical History:   Diagnosis Date    Allergic rhinitis, cause unspecified     GERD (gastroesophageal reflux disease)     Hypertrophy of prostate without urinary obstruction and other lower urinary tract symptoms (LUTS)     Lumbago     Pure hypercholesterolemia        Past Surgical History:   Procedure Laterality Date    MOUTH SURGERY         Current Outpatient Medications   Medication Sig Dispense Refill    clobetasol (TEMOVATE) 0.05 % external cream [CLOBETASOL (TEMOVATE) 0.05 % CREAM] Apply to affected areas BID 30 g 3    fexofenadine (ALLEGRA) 180 MG tablet [FEXOFENADINE (ALLEGRA) 180 MG TABLET] Take 1 tablet (180 mg total) by mouth daily. 90 tablet 1    Fluocinolone Acetonide Scalp 0.01 % OIL oil       ketoconazole (NIZORAL) 2 % external shampoo SHAMPOO ONCE DAILY FOR 2 WEEKS, THEN TWICE A WEEK      mupirocin (BACTROBAN) 2 % external ointment Apply topically 3 times daily 30 g 11    neomycin-polymyxin-hydrocortisone (CORTISPORIN) 3.5-46479-5 otic solution Place 3 drops in ear(s) 4 times daily 10 mL 0    pantoprazole (PROTONIX) 40 MG EC tablet Take 1 tablet (40 mg) by mouth daily 90 tablet 3    pravastatin (PRAVACHOL) 20 MG tablet TAKE 1 TABLET BY MOUTH EVERY DAY 90 tablet 3       Family History   Problem Relation Age of Onset    Leukemia Father     Breast Cancer Mother     Hypertension Mother      "Arthritis Mother     Neuropathy Mother     Brain Cancer Paternal Grandfather     Asthma Daughter     Asthma Son         reports that he quit smoking about 37 years ago. His smoking use included cigarettes. He started smoking about 54 years ago. He has a 34 pack-year smoking history. He has never been exposed to tobacco smoke. He has never used smokeless tobacco. He reports that he does not drink alcohol and does not use drugs.      BP (!) 167/122   Pulse 114   Ht 1.778 m (5' 10\")   Wt 103 kg (227 lb)   BMI 32.57 kg/m    Body mass index is 32.57 kg/m .    EXAM:  GENERAL: Well developed male in NAD  CV: RRR  PULM: Lungs clear to auscultation bilaterally  ABDOMEN: Soft and nondistended.  Diastases of the upper abdomen.  Small reducible umbilical hernia that is minimally tender.  I suspect the fascial defect is about 1 cm.  NEURO: No obvious deficits noted.  EXT: No edema, no obvious deformities or any other abnormalities    Assessment/Plan:    Duane E Miller is a 74 year old male presenting with a small umbilical hernia.  We discussed the pathophysiology of this condition and treatment with open umbilical herniorrhaphy plus or minus mesh.  We discussed the risks of bleeding, infection, and injury to other structures.  We discussed expected postoperative recovery.  The patient would like to proceed soon as possible.  Since he just had a physical and is healthy, he does not need further routine preop clearance.         Dada Calderon MD  General Surgeon  [facility]  Surgery [address]  [address]  [place] [address]?  Office: [phone]    "

## 2024-08-21 NOTE — H&P (VIEW-ONLY)
HPI:  Duane E Miller is a 74 year old male who was referred to me by Mark Anthony Rudolph for evaluation of an umbilical hernia.  This is been present only for a few weeks.  It happened when the patient was lifting weights.  He is very active.  He is never had surgery before he is generally quite healthy.  He had a routine physical just earlier this week and everything came back normal.  He mainly has a bulge at his umbilicus.  Minimal pain.    Allergies:Amoxicillin-pot clavulanate [amoxicillin-pot clavulanate], Atorvastatin, Benzonatate, Paxlovid [nirmatrelvir-ritonavir], Simvastatin, and Tamiflu [oseltamivir]    Past Medical History:   Diagnosis Date    Allergic rhinitis, cause unspecified     GERD (gastroesophageal reflux disease)     Hypertrophy of prostate without urinary obstruction and other lower urinary tract symptoms (LUTS)     Lumbago     Pure hypercholesterolemia        Past Surgical History:   Procedure Laterality Date    MOUTH SURGERY         Current Outpatient Medications   Medication Sig Dispense Refill    clobetasol (TEMOVATE) 0.05 % external cream [CLOBETASOL (TEMOVATE) 0.05 % CREAM] Apply to affected areas BID 30 g 3    fexofenadine (ALLEGRA) 180 MG tablet [FEXOFENADINE (ALLEGRA) 180 MG TABLET] Take 1 tablet (180 mg total) by mouth daily. 90 tablet 1    Fluocinolone Acetonide Scalp 0.01 % OIL oil       ketoconazole (NIZORAL) 2 % external shampoo SHAMPOO ONCE DAILY FOR 2 WEEKS, THEN TWICE A WEEK      mupirocin (BACTROBAN) 2 % external ointment Apply topically 3 times daily 30 g 11    neomycin-polymyxin-hydrocortisone (CORTISPORIN) 3.5-42493-2 otic solution Place 3 drops in ear(s) 4 times daily 10 mL 0    pantoprazole (PROTONIX) 40 MG EC tablet Take 1 tablet (40 mg) by mouth daily 90 tablet 3    pravastatin (PRAVACHOL) 20 MG tablet TAKE 1 TABLET BY MOUTH EVERY DAY 90 tablet 3       Family History   Problem Relation Age of Onset    Leukemia Father     Breast Cancer Mother     Hypertension Mother      "Arthritis Mother     Neuropathy Mother     Brain Cancer Paternal Grandfather     Asthma Daughter     Asthma Son         reports that he quit smoking about 37 years ago. His smoking use included cigarettes. He started smoking about 54 years ago. He has a 34 pack-year smoking history. He has never been exposed to tobacco smoke. He has never used smokeless tobacco. He reports that he does not drink alcohol and does not use drugs.      BP (!) 167/122   Pulse 114   Ht 1.778 m (5' 10\")   Wt 103 kg (227 lb)   BMI 32.57 kg/m    Body mass index is 32.57 kg/m .    EXAM:  GENERAL: Well developed male in NAD  CV: RRR  PULM: Lungs clear to auscultation bilaterally  ABDOMEN: Soft and nondistended.  Diastases of the upper abdomen.  Small reducible umbilical hernia that is minimally tender.  I suspect the fascial defect is about 1 cm.  NEURO: No obvious deficits noted.  EXT: No edema, no obvious deformities or any other abnormalities    Assessment/Plan:    Duane E Miller is a 74 year old male presenting with a small umbilical hernia.  We discussed the pathophysiology of this condition and treatment with open umbilical herniorrhaphy plus or minus mesh.  We discussed the risks of bleeding, infection, and injury to other structures.  We discussed expected postoperative recovery.  The patient would like to proceed soon as possible.  Since he just had a physical and is healthy, he does not need further routine preop clearance.         Dada Calderon MD  General Surgeon  [facility]  Surgery [address]  [address]  [place] [address]?  Office: [phone]    "

## 2024-08-21 NOTE — TELEPHONE ENCOUNTER
Spoke with patient today regarding surgery scheduling      Went over details/instructions.    Surgery Letter sent via [employer]  (Please see LETTERS TAB in chart to retrieve a copy of this letter)

## 2024-08-21 NOTE — LETTER
Pre-op Physical: To be done by Dr Calderon day of procedure.    Surgery Date: 9/10/2024     Location: [facility] [address], [place] [address]    Approximate Arrival Time: 9:00 am  (Unless instructed differently by the pre-op call nurse)     Post op Appointment: 9/23/2024 at   1:30 pm  with  Dr Calderon . [facility], [facility], [place] [id number].    Pre-Surgical Tasks:     Review all medications with your primary care or prescribing physician; they will advise you which meds to stop and when, and when you can resume taking.  Certain medications like blood thinners and weight loss medications need to be stopped in advance of surgery to proceed safely.      Blood thinners including but not exclusive to drugs like Xarelto, Eliquis, Warfarin and Aspirin, should be stopped five days before surgery, if your prescribing provider agrees. Follow your provider's advice on stopping blood thinners because they know you best.  If you are unsure if your medication is a blood thinner, ask your prescribing provider.    Weight loss medications: There are multiple medications being used for weight management and diabetes today, and the list is growing.  Phentermine, Ozempic, Wegovy, Trulicity, and other similar medications need to be stopped one week before surgery to avoid being cancelled.  Victoza and Saxenda can be continued longer but must be stopped one full day before surgery.  Please ask your prescribing provider for advice.    Diabetic medications: in addition to the medications talked about above that are used for either weight loss or diabetes, some people are on insulin that may require adjustment.  Please discuss managing diabetic medications with your prescribing doctor as these medications may require modification prior to surgery.     Please shower the evening before and morning of surgery with Hibiclens soap.   This can be found at your local pharmacy.     Fasting instructions will be provided by the pre-op nurse who will call you 1-3 days before surgery.  Typically, we advise normal food up to 8 hours before you arrive for surgery. Clear liquids only from then until 2 hours before you arrive surgery, then nothing at all by mouth.  The nurse will review your specific instructions with you at the call.      Smoking impacts your body's ability to heal properly so we advise patients to quit if possible before surgery.  Plastic Surgery patients are required to be nicotine free for at least 8 weeks before surgery.      You will need an adult to drive you home and stay with you 24 hours after surgery. Public transportation or Medical Van Services are not permitted.    Visitor restrictions are subject to change, please verify with the pre-op nurse when they call how many people are permitted to accompany you.    We always encourage you to notify your insurance any time you have medical tests or procedures scheduled including surgery. The number is usually right on the back of your insurance card. To obtain pricing for surgery, please call [facility] Cost of Care at [phone] or email [email or web address].        Call our office if you have any questions! Thank you!     Riley Reynoso  Complex   [facility]  [phone]

## 2024-08-23 ENCOUNTER — HOSPITAL ENCOUNTER (OUTPATIENT)
Dept: ULTRASOUND IMAGING | Facility: CLINIC | Age: 74
Discharge: HOME | End: 2024-08-23
Attending: PHYSICIAN ASSISTANT | Admitting: PHYSICIAN ASSISTANT
Payer: COMMERCIAL

## 2024-08-23 DIAGNOSIS — Z13.6 ENCOUNTER FOR ABDOMINAL AORTIC ANEURYSM (AAA) SCREENING: ICD-10-CM

## 2024-08-23 PROCEDURE — 76706 US ABDL AORTA SCREEN AAA: CPT

## 2024-09-09 ENCOUNTER — ANESTHESIA EVENT (OUTPATIENT)
Dept: SURGERY | Facility: AMBULATORY SURGERY CENTER | Age: 74
End: 2024-09-09
Payer: COMMERCIAL

## 2024-09-10 ENCOUNTER — ANESTHESIA (OUTPATIENT)
Dept: SURGERY | Facility: AMBULATORY SURGERY CENTER | Age: 74
End: 2024-09-10
Payer: COMMERCIAL

## 2024-09-10 ENCOUNTER — HOSPITAL ENCOUNTER (OUTPATIENT)
Facility: AMBULATORY SURGERY CENTER | Age: 74
Discharge: HOME | End: 2024-09-10
Attending: SURGERY
Payer: COMMERCIAL

## 2024-09-10 VITALS
DIASTOLIC BLOOD PRESSURE: 67 MMHG | SYSTOLIC BLOOD PRESSURE: 130 MMHG | RESPIRATION RATE: 16 BRPM | TEMPERATURE: 97 F | BODY MASS INDEX: 32.57 KG/M2 | HEART RATE: 51 BPM | OXYGEN SATURATION: 98 % | WEIGHT: 227 LBS

## 2024-09-10 DIAGNOSIS — G89.18 ACUTE POST-OPERATIVE PAIN: Primary | ICD-10-CM

## 2024-09-10 DIAGNOSIS — K42.9 UMBILICAL HERNIA WITHOUT OBSTRUCTION AND WITHOUT GANGRENE: ICD-10-CM

## 2024-09-10 PROCEDURE — 49591 RPR AA HRN 1ST < 3 CM RDC: CPT | Performed by: SURGERY

## 2024-09-10 DEVICE — PATCH, HERNIA, SMALL, CIRCLE WITH STRAP, VENTRALEX ST, 1.7IN (4.3CM) DIAMETER 5950007: Type: IMPLANTABLE DEVICE | Site: ABDOMEN | Status: FUNCTIONAL

## 2024-09-10 RX ORDER — DEXAMETHASONE SODIUM PHOSPHATE 4 MG/ML
INJECTION, SOLUTION INTRA-ARTICULAR; INTRALESIONAL; INTRAMUSCULAR; INTRAVENOUS; SOFT TISSUE PRN
Status: DISCONTINUED | OUTPATIENT
Start: 2024-09-10 | End: 2024-09-10

## 2024-09-10 RX ORDER — ONDANSETRON 2 MG/ML
4 INJECTION INTRAMUSCULAR; INTRAVENOUS EVERY 30 MIN PRN
Status: DISCONTINUED | OUTPATIENT
Start: 2024-09-10 | End: 2024-09-11 | Stop reason: HOSPADM

## 2024-09-10 RX ORDER — FENTANYL CITRATE 50 UG/ML
INJECTION, SOLUTION INTRAMUSCULAR; INTRAVENOUS PRN
Status: DISCONTINUED | OUTPATIENT
Start: 2024-09-10 | End: 2024-09-10

## 2024-09-10 RX ORDER — ONDANSETRON 4 MG/1
4 TABLET, ORALLY DISINTEGRATING ORAL EVERY 30 MIN PRN
Status: DISCONTINUED | OUTPATIENT
Start: 2024-09-10 | End: 2024-09-11 | Stop reason: HOSPADM

## 2024-09-10 RX ORDER — PROPOFOL 10 MG/ML
INJECTION, EMULSION INTRAVENOUS CONTINUOUS PRN
Status: DISCONTINUED | OUTPATIENT
Start: 2024-09-10 | End: 2024-09-10

## 2024-09-10 RX ORDER — ACETAMINOPHEN 325 MG/1
975 TABLET ORAL ONCE
Status: COMPLETED | OUTPATIENT
Start: 2024-09-10 | End: 2024-09-10

## 2024-09-10 RX ORDER — ONDANSETRON 2 MG/ML
INJECTION INTRAMUSCULAR; INTRAVENOUS PRN
Status: DISCONTINUED | OUTPATIENT
Start: 2024-09-10 | End: 2024-09-10

## 2024-09-10 RX ORDER — LIDOCAINE 40 MG/G
CREAM TOPICAL
Status: DISCONTINUED | OUTPATIENT
Start: 2024-09-10 | End: 2024-09-11 | Stop reason: HOSPADM

## 2024-09-10 RX ORDER — LIDOCAINE HYDROCHLORIDE 20 MG/ML
INJECTION, SOLUTION INFILTRATION; PERINEURAL PRN
Status: DISCONTINUED | OUTPATIENT
Start: 2024-09-10 | End: 2024-09-10

## 2024-09-10 RX ORDER — FENTANYL CITRATE 0.05 MG/ML
25 INJECTION, SOLUTION INTRAMUSCULAR; INTRAVENOUS
Status: DISCONTINUED | OUTPATIENT
Start: 2024-09-10 | End: 2024-09-11 | Stop reason: HOSPADM

## 2024-09-10 RX ORDER — MAGNESIUM SULFATE HEPTAHYDRATE 40 MG/ML
4 INJECTION, SOLUTION INTRAVENOUS ONCE
Status: COMPLETED | OUTPATIENT
Start: 2024-09-10 | End: 2024-09-10

## 2024-09-10 RX ORDER — OXYCODONE HYDROCHLORIDE 5 MG/1
5 TABLET ORAL ONCE
Status: COMPLETED | OUTPATIENT
Start: 2024-09-10 | End: 2024-09-10

## 2024-09-10 RX ORDER — OXYCODONE HYDROCHLORIDE 5 MG/1
2.5-5 TABLET ORAL EVERY 4 HOURS PRN
Qty: 5 TABLET | Refills: 0 | Status: SHIPPED | OUTPATIENT
Start: 2024-09-10

## 2024-09-10 RX ORDER — CEFAZOLIN SODIUM 2 G/100ML
2 INJECTION, SOLUTION INTRAVENOUS
Status: COMPLETED | OUTPATIENT
Start: 2024-09-10 | End: 2024-09-10

## 2024-09-10 RX ORDER — NALOXONE HYDROCHLORIDE 0.4 MG/ML
0.1 INJECTION, SOLUTION INTRAMUSCULAR; INTRAVENOUS; SUBCUTANEOUS
Status: DISCONTINUED | OUTPATIENT
Start: 2024-09-10 | End: 2024-09-11 | Stop reason: HOSPADM

## 2024-09-10 RX ORDER — DEXAMETHASONE SODIUM PHOSPHATE 4 MG/ML
4 INJECTION, SOLUTION INTRA-ARTICULAR; INTRALESIONAL; INTRAMUSCULAR; INTRAVENOUS; SOFT TISSUE
Status: DISCONTINUED | OUTPATIENT
Start: 2024-09-10 | End: 2024-09-11 | Stop reason: HOSPADM

## 2024-09-10 RX ORDER — SODIUM CHLORIDE, SODIUM LACTATE, POTASSIUM CHLORIDE, CALCIUM CHLORIDE 600; 310; 30; 20 MG/100ML; MG/100ML; MG/100ML; MG/100ML
INJECTION, SOLUTION INTRAVENOUS CONTINUOUS
Status: DISCONTINUED | OUTPATIENT
Start: 2024-09-10 | End: 2024-09-11 | Stop reason: HOSPADM

## 2024-09-10 RX ORDER — CEFAZOLIN SODIUM 2 G/100ML
2 INJECTION, SOLUTION INTRAVENOUS SEE ADMIN INSTRUCTIONS
Status: DISCONTINUED | OUTPATIENT
Start: 2024-09-10 | End: 2024-09-11 | Stop reason: HOSPADM

## 2024-09-10 RX ADMIN — ACETAMINOPHEN 975 MG: 325 TABLET ORAL at 08:06

## 2024-09-10 RX ADMIN — PROPOFOL 200 MCG/KG/MIN: 10 INJECTION, EMULSION INTRAVENOUS at 08:57

## 2024-09-10 RX ADMIN — LIDOCAINE HYDROCHLORIDE 2 ML: 20 INJECTION, SOLUTION INFILTRATION; PERINEURAL at 08:57

## 2024-09-10 RX ADMIN — ONDANSETRON 4 MG: 2 INJECTION INTRAMUSCULAR; INTRAVENOUS at 09:02

## 2024-09-10 RX ADMIN — CEFAZOLIN SODIUM 2 G: 2 INJECTION, SOLUTION INTRAVENOUS at 08:51

## 2024-09-10 RX ADMIN — MAGNESIUM SULFATE HEPTAHYDRATE 4 G: 40 INJECTION, SOLUTION INTRAVENOUS at 08:32

## 2024-09-10 RX ADMIN — OXYCODONE HYDROCHLORIDE 5 MG: 5 TABLET ORAL at 09:54

## 2024-09-10 RX ADMIN — DEXAMETHASONE SODIUM PHOSPHATE 4 MG: 4 INJECTION, SOLUTION INTRA-ARTICULAR; INTRALESIONAL; INTRAMUSCULAR; INTRAVENOUS; SOFT TISSUE at 09:02

## 2024-09-10 RX ADMIN — FENTANYL CITRATE 50 MCG: 50 INJECTION, SOLUTION INTRAMUSCULAR; INTRAVENOUS at 08:57

## 2024-09-10 RX ADMIN — SODIUM CHLORIDE, SODIUM LACTATE, POTASSIUM CHLORIDE, CALCIUM CHLORIDE: 600; 310; 30; 20 INJECTION, SOLUTION INTRAVENOUS at 08:31

## 2024-09-10 NOTE — ANESTHESIA CARE TRANSFER NOTE
Patient: Duane E Miller    Procedure: Procedure(s):  HERNIORRHAPHY, UMBILICAL, OPEN WITH MESH       Diagnosis: Umbilical hernia without obstruction and without gangrene [K42.9]  Diagnosis Additional Information: No value filed.    Anesthesia Type:   MAC     Note:    Oropharynx: oropharynx clear of all foreign objects and spontaneously breathing  Level of Consciousness: awake  Oxygen Supplementation: room air    Independent Airway: airway patency satisfactory and stable  Dentition: dentition unchanged  Vital Signs Stable: post-procedure vital signs reviewed and stable  Report to RN Given: handoff report given  Patient transferred to: Phase II    Handoff Report: Identifed the Patient, Identified the Reponsible Provider, Reviewed the pertinent medical history, Discussed the surgical course, Reviewed Intra-OP anesthesia mangement and issues during anesthesia, Set expectations for post-procedure period and Allowed opportunity for questions and acknowledgement of understanding      Vitals:  Vitals Value Taken Time   /55 09/10/24 0939   Temp 97  F (36.1  C) 09/10/24 0939   Pulse 62 09/10/24 0939   Resp 16 09/10/24 0939   SpO2 94 % 09/10/24 0939       Electronically Signed By: CAREY Warren CRNA  September 10, 2024  9:41 AM

## 2024-09-10 NOTE — OP NOTE
General Surgery Operative Note    Date of Surgery: 09/10/24      Surgeon: Dada Calderon MD    Assistant: None    Preoperative Diagnosis: Umbilical hernia    Postoperative Diagnosis: Umbilical hernia    Procedure: Open umbilical herniorrhaphy with mesh    EBL: 2 cc    Findings: 2 cm umbilical fascial defect    Indications:  Patient is a 74-year-old man presented my clinic with symptomatic umbilical hernia.  After discussion of the risks and benefits of open repair, the patient consented to the procedure.    Details of operation:  Patient was brought to the operating room and laid on the table in the supine position.  MAC anesthesia was initiated without incident.  The patient was prepped and draped in usual sterile fashion.  A timeout for safety was performed.    I began by injecting local anesthetic around the umbilicus.  I made a curvilinear incision below the umbilicus.  I then used a combination electrocautery and blunt dissection to dissect off the umbilical stalk from the fascia and the surrounding subcutaneous tissue identifying the hernia contents.  As a proceeded with this dissection, it became apparent that this hernia was much larger than it appeared clinically.  I decided to excise the hernia sac and significant portion of herniated preperitoneal fat.  With these removed, it was clear to see the defect.  It was approximately 2 cm.  I chose a 4.3 cm Ventralex mesh that was placed beneath the defect.  This was secured in place with 0 Ethibond sutures.  I then closed the defect transversely over the mesh using 0 Ethibond interrupted sutures.  The umbilical stalk was tacked back down to the umbilicus using a 3-0 Vicryl suture.  The skin was closed in 2 layers with a 3-0 Vicryl suture and then a running 4 Monocryl subcuticular stitch.  Dermabond was used as a dressing.  I did place a cottonball and Tegaderm as well to provide some extra support.  The patient tolerated the procedure well.  No immediate  complications were noted.    Dada Calderon MD  General Surgeon  [address] [address]?  Office: [phone]

## 2024-09-10 NOTE — ANESTHESIA POSTPROCEDURE EVALUATION
Patient: Duane E Miller    Procedure: Procedure(s):  HERNIORRHAPHY, UMBILICAL, OPEN WITH MESH       Anesthesia Type:  MAC    Note:     Postop Pain Control: Uneventful            Sign Out: Well controlled pain   PONV: No   Neuro/Psych: Uneventful            Sign Out: Acceptable/Baseline neuro status   Airway/Respiratory: Uneventful            Sign Out: Acceptable/Baseline resp. status   CV/Hemodynamics: Uneventful            Sign Out: Acceptable CV status; No obvious hypovolemia; No obvious fluid overload   Other NRE: NONE   DID A NON-ROUTINE EVENT OCCUR? No           Last vitals:  Vitals Value Taken Time   /67 09/10/24 1000   Temp 97  F (36.1  C) 09/10/24 0939   Pulse 55 09/10/24 1011   Resp 16 09/10/24 1000   SpO2 98 % 09/10/24 1011   Vitals shown include unfiled device data.    Electronically Signed By: James Mace MD  September 10, 2024  10:53 AM

## 2024-09-10 NOTE — INTERVAL H&P NOTE
"I have reviewed the surgical (or preoperative) H&P that is linked to this encounter, and examined the patient. There are no significant changes    Clinical Conditions Present on Arrival:  Clinically Significant Risk Factors Present on Admission                      # Obesity: Estimated body mass index is 32.57 kg/m  as calculated from the following:    Height as of 8/21/24: 1.778 m (5' 10\").    Weight as of this encounter: 103 kg (227 lb).       "

## 2024-09-10 NOTE — DISCHARGE INSTRUCTIONS
You have received 975 mg of Acetaminophen (Tylenol) at 8:06. Please do not take an additional dose of Tylenol until after 2:06 pm     Do not exceed 4,000 mg of acetaminophen during a 24 hour period and keep in mind that acetaminophen can also be found in many over-the-counter cold medications as well as narcotics that may be given for pain.     If you have any questions or concerns regarding your procedure, please contact Dr. Calderon, his office number is [phone].     Adult Discharge Orders & Instructions     For 24 hours after surgery    Get plenty of rest.  A responsible adult must stay with you for at least 24 hours after you leave the hospital.   Do not drive or use heavy equipment.  If you have weakness or tingling, don't drive or use heavy equipment until this feeling goes away.  Do not drink alcohol.  Avoid strenuous or risky activities.  Ask for help when climbing stairs.   You may feel lightheaded.  IF so, sit for a few minutes before standing.  Have someone help you get up.   If you have nausea (feel sick to your stomach): Drink only clear liquids such as apple juice, ginger ale, broth or 7-Up.  Rest may also help.  Be sure to drink enough fluids.  Move to a regular diet as you feel able.  You may have a slight fever. Call the doctor if your fever is over 100 F (37.7 C) (taken under the tongue) or lasts longer than 24 hours.  You may have a dry mouth, a sore throat, muscle aches or trouble sleeping.  These should go away after 24 hours.  Do not make important or legal decisions.     Call your doctor for any of the followin.  Signs of infection (fever, growing tenderness at the surgery site, a large amount of drainage or bleeding, severe pain, foul-smelling drainage, redness, swelling).    2. It has been over 8 to 10 hours since surgery and you are still not able to urinate (pass water).    3.  Headache for over 24 hours.

## 2024-09-10 NOTE — ANESTHESIA PREPROCEDURE EVALUATION
Anesthesia Pre-Procedure Evaluation    Patient: Duane E Miller   MRN: [id number] : 1950        Procedure : Procedure(s):  HERNIORRHAPHY, UMBILICAL, OPEN          Past Medical History:   Diagnosis Date    Allergic rhinitis, cause unspecified     GERD (gastroesophageal reflux disease)     Hypertension     Hypertrophy of prostate without urinary obstruction and other lower urinary tract symptoms (LUTS)     Lumbago     Pure hypercholesterolemia       Past Surgical History:   Procedure Laterality Date    MOUTH SURGERY        Allergies   Allergen Reactions    Amoxicillin-Pot Clavulanate [Amoxicillin-Pot Clavulanate] Nausea and Vomiting    Atorvastatin Unknown    Benzonatate Other (See Comments)     Sore Throat    Paxlovid [Nirmatrelvir-Ritonavir] Nausea    Simvastatin Unknown     Other: joint pain      Tamiflu [Oseltamivir] Nausea and Vomiting      Social History     Tobacco Use    Smoking status: Former     Current packs/day: 0.00     Average packs/day: 2.0 packs/day for 17.0 years (34.0 ttl pk-yrs)     Types: Cigarettes     Start date:      Quit date:      Years since quittin.7     Passive exposure: Never    Smokeless tobacco: Never   Substance Use Topics    Alcohol use: Never      Wt Readings from Last 1 Encounters:   24 103 kg (227 lb)        Anesthesia Evaluation            ROS/MED HX  ENT/Pulmonary:  - neg pulmonary ROS     Neurologic:  - neg neurologic ROS     Cardiovascular:  - neg cardiovascular ROS   (+)  hypertension- -   -  - -                                      METS/Exercise Tolerance: >4 METS    Hematologic:  - neg hematologic  ROS     Musculoskeletal:  - neg musculoskeletal ROS     GI/Hepatic: Comment: Hernia undergoing repair  - neg GI/hepatic ROS   (+) GERD,                   Renal/Genitourinary:  - neg Renal ROS     Endo:  - neg endo ROS   (+)               Obesity,       Psychiatric/Substance Use:  - neg psychiatric ROS     Infectious Disease:  - neg infectious disease ROS    "  Malignancy:  - neg malignancy ROS     Other:            Physical Exam    Airway  airway exam normal           Respiratory Devices and Support         Dental           Cardiovascular   cardiovascular exam normal          Pulmonary   pulmonary exam normal                OUTSIDE LABS:  CBC:   Lab Results   Component Value Date    WBC 7.7 08/19/2024    WBC 7.2 08/11/2023    HGB 15.1 08/19/2024    HGB 15.1 08/11/2023    HCT 44.7 08/19/2024    HCT 44.5 08/11/2023     08/19/2024     08/11/2023     BMP:   Lab Results   Component Value Date     08/19/2024     08/11/2023    POTASSIUM 4.4 08/19/2024    POTASSIUM 4.6 08/11/2023    CHLORIDE 103 08/19/2024    CHLORIDE 104 08/11/2023    CO2 26 08/19/2024    CO2 25 08/11/2023    BUN 18.5 08/19/2024    BUN 18.3 08/11/2023    CR 1.18 (H) 08/19/2024    CR 1.04 08/11/2023    GLC 96 08/19/2024     (H) 08/11/2023     COAGS:   Lab Results   Component Value Date    INR 0.99 07/17/2018     POC: No results found for: \"BGM\", \"HCG\", \"HCGS\"  HEPATIC:   Lab Results   Component Value Date    ALBUMIN 4.4 08/19/2024    PROTTOTAL 7.3 08/19/2024    ALT 11 08/19/2024    AST 18 08/19/2024    ALKPHOS 65 08/19/2024    BILITOTAL 1.5 (H) 08/19/2024     OTHER:   Lab Results   Component Value Date    A1C 5.5 07/21/2020    RAUL 9.4 08/19/2024       Anesthesia Plan    ASA Status:  2    NPO Status:  NPO Appropriate    Anesthesia Type: MAC.   Induction: Propofol.           Consents    Anesthesia Plan(s) and associated risks, benefits, and realistic alternatives discussed. Questions answered and patient/representative(s) expressed understanding.     - Discussed:     - Discussed with:  Patient            Postoperative Care            Comments:               James Mace MD    I have reviewed the pertinent notes and labs in the chart from the past 30 days and (re)examined the patient.  Any updates or changes from those notes are reflected in this note.              # Obesity: " "Estimated body mass index is 32.57 kg/m  as calculated from the following:    Height as of 8/21/24: 1.778 m (5' 10\").    Weight as of 8/21/24: 103 kg (227 lb).      "

## 2024-09-18 ENCOUNTER — NURSE TRIAGE (OUTPATIENT)
Dept: FAMILY MEDICINE | Facility: CLINIC | Age: 74
End: 2024-09-18
Payer: COMMERCIAL

## 2024-09-18 ENCOUNTER — MYC MEDICAL ADVICE (OUTPATIENT)
Dept: FAMILY MEDICINE | Facility: CLINIC | Age: 74
End: 2024-09-18
Payer: COMMERCIAL

## 2024-09-18 NOTE — TELEPHONE ENCOUNTER
Unsure if the rash is from the vaccine or not. Without seeing the rash its hard to tell. Since he just had surgery I would suggest being seen for evaluation.

## 2024-09-18 NOTE — TELEPHONE ENCOUNTER
Provider Recommendation Follow Up:   Reached patient/caregiver. Informed of provider's recommendations. Patient verbalized understanding and agrees with the plan.     Appointment scheduled for 9/19.    Maria Victoria Fall RN  [facility]

## 2024-09-18 NOTE — TELEPHONE ENCOUNTER
"Nurse Triage SBAR    Is this a 2nd Level Triage? NO    Situation: Patient sent [employer] message:  \"I had my umbilical hernia surgery on 9/10/2026. On 9/15/2026, I got the first of 2 shingles vaccines at [facility]. On 9/16, I noticed a rash throughout the trunk of my body. It s still there. The pharmacist said I should call you to see what you think caused it and ask if I should get the 2nd shingles shot when applicable.\"    Background: Reports is still recovering from surgery and has some continued pain.    Assessment: Patient reports rash is located on abdomen and wraps around to lower back. Rash is small, pin point, pink to red bumps with some surrounding redness. It was itchy but itching has resolved with use of Benadryl ointment and oral Benadryl. The Benadryl has not improved appearance of rash.   Patient reports he has also had mild body aches. No fever.  No blisters or open skin.     Rash has not gotten any better or worse since onset on 9/19.     Protocol Recommended Disposition:   Home Care    Recommendation: Home care advise provided. Patient is wondering if this could be a side effect of shingles vaccine and if he should get 2nd dose in the future.   Routed to PCP to review and advise.     Routed to provider    Does the patient meet one of the following criteria for ADS visit consideration? 16+ years old, with an [facility] PCP     TIP  Providers, please consider if this condition is appropriate for management at one of our Acute and Diagnostic Services sites.     If patient is a good candidate, please use dotphrase <dot>triageresponse and select Refer to ADS to document.    Reason for Disposition   Mild localized rash    Additional Information   Negative: Sounds like a life-threatening emergency to the triager   Negative: Athlete's Foot suspected (i.e., itchy rash between the toes)   Negative: Insect bite(s) suspected   Negative: Jock Itch suspected (i.e., itchy rash on inner thighs near genital area)   Negative: " Localized lump (or swelling) without redness or rash   Negative: MPOX SUSPECTED (e.g., direct skin contact such as sex, recent travel to [place] or [place]) and any SYMPTOMS OF MPOX (e.g., rash, fever, muscle aches, or swollen lymph nodes)   Negative: At risk for Mpox (men-who-have-sex-with-men) and possible exposure (e.g., multiple sex partners in past 21 days) and ANY SYMPTOMS OF MPOX (e.g., rash, fever, muscle aches, or swollen lymph nodes)   Negative: Poison ivy, oak, or sumac rash suspected (e.g., itchy rash after contact with poison ivy)   Negative: Rash of female genital area (e.g., labia, vagina, vulva)   Negative: Rash of male genital area (e.g., penis, scrotum)   Negative: Redness of immunization site   Negative: Wound infection suspected (i.e., pain, spreading redness, or pus; in a cut, puncture, scrape or sutured wound)   Negative: Shingles suspected (i.e., painful rash, multiple small blisters grouped together in one area of body; dermatomal distribution)   Negative: Small spot, skin growth, or mole   Negative: Fever and localized purple or blood-colored spots or dots that are not from injury or friction   Negative: Fever and localized rash is very painful   Negative: Patient sounds very sick or weak to the triager   Negative: Looks like a boil, infected sore, deep ulcer, or other infected rash (spreading redness, pus)   Negative: Painful rash with multiple small blisters grouped together (i.e., dermatomal distribution or 'band' or 'stripe')   Negative: Localized rash is very painful (no fever)   Negative: Localized purple or blood-colored spots or dots that are not from injury or friction (no fever)   Negative: Lyme disease suspected (e.g., bull's-eye rash or tick bite / exposure)   Negative: Patient wants to be seen   Negative: Tender bumps in armpits   Negative: Pimples (localized) and no improvement after using CARE ADVICE   Negative: SEVERE local itching persists after 2 days of steroid cream    "Negative: Applying cream or ointment and it causes severe itch, burning, or pain   Negative: Medication patch causing local rash or itching   Negative: Localized rash present > 7 days   Negative: Red, moist, irritated area between skin folds (or under larger breasts)   Negative: Localized area of skin darkening or thickening on lower legs or ankles and has NOT been evaluated by a doctor (or NP/PA)    Answer Assessment - Initial Assessment Questions  1. APPEARANCE of RASH: \"Describe the rash.\"       Bumpy and red. Little, tiny dots  2. LOCATION: \"Where is the rash located?\"       *No Answer*  3. NUMBER: \"How many spots are there?\"       *No Answer*  4. SIZE: \"How big are the spots?\" (Inches, centimeters or compare to size of a coin)       Pin point sized  5. ONSET: \"When did the rash start?\"       Monday and has not gotten better or worse.  6. ITCHING: \"Does the rash itch?\" If Yes, ask: \"How bad is the itch?\"  (Scale 0-10; or none, mild, moderate, severe)      Was, no longer  7. PAIN: \"Does the rash hurt?\" If Yes, ask: \"How bad is the pain?\"  (Scale 0-10; or none, mild, moderate, severe)     - NONE (0): no pain     - MILD (1-3): doesn't interfere with normal activities      - MODERATE (4-7): interferes with normal activities or awakens from sleep      - SEVERE (8-10): excruciating pain, unable to do any normal activities      No pain   8. OTHER SYMPTOMS: \"Do you have any other symptoms?\" (e.g., fever)      No fever, has body aches - arm where injection is sore. Headache off and on. Tylenol helps.   9. PREGNANCY: \"Is there any chance you are pregnant?\" \"When was your last menstrual period?\"      NA    Protocols used: Rash or Redness - [id number]    Maria Victoria Fall RN  [facility]    "

## 2024-09-19 ENCOUNTER — OFFICE VISIT (OUTPATIENT)
Dept: FAMILY MEDICINE | Facility: CLINIC | Age: 74
End: 2024-09-19
Payer: COMMERCIAL

## 2024-09-19 VITALS
HEIGHT: 71 IN | DIASTOLIC BLOOD PRESSURE: 80 MMHG | WEIGHT: 226.6 LBS | SYSTOLIC BLOOD PRESSURE: 185 MMHG | RESPIRATION RATE: 12 BRPM | TEMPERATURE: 98 F | OXYGEN SATURATION: 99 % | HEART RATE: 94 BPM | BODY MASS INDEX: 31.72 KG/M2

## 2024-09-19 DIAGNOSIS — R21 RASH: Primary | ICD-10-CM

## 2024-09-19 PROCEDURE — 99213 OFFICE O/P EST LOW 20 MIN: CPT | Performed by: FAMILY MEDICINE

## 2024-09-19 ASSESSMENT — PAIN SCALES - GENERAL: PAINLEVEL: NO PAIN (0)

## 2024-09-19 NOTE — PROGRESS NOTES
"  Assessment & Plan     Rash  Possibly related to the vaccine.  Patient questions if this is related to surgery at all, no evidence of that.  She is recovering well from surgery, no signs of infection.                Subjective Duane is a 74 year old, presenting for the following health issues:  Rash (Rash on torso after getting shingles vaccine on 9/15/24. Pt also had surgery on 9/10/24. Pt states rash started 9/16/24. Pt states he just feels off, shaky, chills, and achy. Pt states it is a bit itchy but not painful. )        9/19/2024     1:32 PM   Additional Questions   Roomed by Yane MCRAE CMA     Rash  Associated symptoms include a rash.   History of Present Illness       Reason for visit:  Rash  Symptom onset:  1-3 days ago  Symptoms include:  Rash  Symptom intensity:  Moderate  Symptom progression:  Staying the same  Had these symptoms before:  No  What makes it worse:  No  What makes it better:  No   He is taking medications regularly.               Review of Systems  Constitutional, HEENT, cardiovascular, pulmonary, gi and gu systems are negative, except as otherwise noted.      Objective    BP (!) 183/94 (BP Location: Right arm, Patient Position: Sitting, Cuff Size: Adult Regular)   Pulse 94   Temp 98  F (36.7  C) (Oral)   Resp 12   Ht 1.803 m (5' 11\")   Wt 102.8 kg (226 lb 9.6 oz)   SpO2 99%   BMI 31.60 kg/m    Body mass index is 31.6 kg/m .  Physical Exam   GENERAL: alert and no distress  Diffuse macular rash across the abdomen and sore circles around to back.  Rest of physical exam is normal.            Signed Electronically by: QUINTIN BACA MD    "

## 2024-09-23 ENCOUNTER — OFFICE VISIT (OUTPATIENT)
Dept: SURGERY | Facility: CLINIC | Age: 74
End: 2024-09-23
Payer: COMMERCIAL

## 2024-09-23 DIAGNOSIS — R21 RASH: Primary | ICD-10-CM

## 2024-09-23 PROCEDURE — 99212 OFFICE O/P EST SF 10 MIN: CPT | Performed by: SURGERY

## 2024-09-23 NOTE — LETTER
9/23/2024      Duane E Miller  [address] [place] [id number]      Dear Colleague,    Thank you for referring your patient, Duane E Miller, to the [facility] [place]. Please see a copy of my visit note below.    General surgery clinic follow-up visit    74-year-old man who is postoperative day 13 from an uneventful umbilical hernia repair.  He returns today simply to show me a rash that has developed.  5 days after his surgery he got his shingles vaccine and the following day developed a rash across his entire abdomen.  This is not particularly bothersome to him.  He saw his primary care physician who felt this was the vaccine but wanted to share with me just in case it had any effect on his hernia repair.    On exam he looks well.  He has a diffuse macular rash across his entire abdominal wall which worsens towards the back.  This has a slight sandpaper feel to it.  His umbilicus appears completely normal.  The incision is healed nicely without any surrounding cellulitis.  He has no tenderness at this location.    Patient presenting with a macular rash across his abdomen occurring soon after shingles vaccine.  I do not think this in any way related to his hernia and I do not think you will have any negative effects on his repair.  No further follow-up needed.  Call if any questions arise.    Dada Calderon MD  General Surgeon  [facility]  Surgery Clinic - [address]  [address]  [place] [address]  Office: [phone]      Again, thank you for allowing me to participate in the care of your patient.        Sincerely,        Dada Calderon MD

## 2024-09-23 NOTE — PROGRESS NOTES
General surgery clinic follow-up visit    74-year-old man who is postoperative day 13 from an uneventful umbilical hernia repair.  He returns today simply to show me a rash that has developed.  5 days after his surgery he got his shingles vaccine and the following day developed a rash across his entire abdomen.  This is not particularly bothersome to him.  He saw his primary care physician who felt this was the vaccine but wanted to share with me just in case it had any effect on his hernia repair.    On exam he looks well.  He has a diffuse macular rash across his entire abdominal wall which worsens towards the back.  This has a slight sandpaper feel to it.  His umbilicus appears completely normal.  The incision is healed nicely without any surrounding cellulitis.  He has no tenderness at this location.    Patient presenting with a macular rash across his abdomen occurring soon after shingles vaccine.  I do not think this in any way related to his hernia and I do not think you will have any negative effects on his repair.  No further follow-up needed.  Call if any questions arise.    Dada Calderon MD  General Surgeon  [facility]  Surgery Clinic [address]  [address]  [place] [address]  Office: [phone]

## 2025-04-06 ENCOUNTER — HEALTH MAINTENANCE LETTER (OUTPATIENT)
Age: 75
End: 2025-04-06

## 2025-07-13 DIAGNOSIS — E78.00 PURE HYPERCHOLESTEROLEMIA: ICD-10-CM

## 2025-07-14 RX ORDER — PRAVASTATIN SODIUM 20 MG
20 TABLET ORAL DAILY
Qty: 90 TABLET | Refills: 0 | Status: SHIPPED | OUTPATIENT
Start: 2025-07-14

## 2025-07-23 ENCOUNTER — PATIENT OUTREACH (OUTPATIENT)
Dept: CARE COORDINATION | Facility: CLINIC | Age: 75
End: 2025-07-23
Payer: COMMERCIAL

## 2025-07-24 DIAGNOSIS — Z12.11 COLON CANCER SCREENING: ICD-10-CM

## 2025-08-07 ENCOUNTER — ORDERS ONLY (AUTO-RELEASED) (OUTPATIENT)
Dept: URGENT CARE | Facility: CLINIC | Age: 75
End: 2025-08-07
Payer: COMMERCIAL

## 2025-08-07 DIAGNOSIS — Z12.11 COLON CANCER SCREENING: ICD-10-CM

## 2025-08-14 SDOH — HEALTH STABILITY: PHYSICAL HEALTH: ON AVERAGE, HOW MANY DAYS PER WEEK DO YOU ENGAGE IN MODERATE TO STRENUOUS EXERCISE (LIKE A BRISK WALK)?: 5 DAYS

## 2025-08-14 SDOH — HEALTH STABILITY: PHYSICAL HEALTH: ON AVERAGE, HOW MANY MINUTES DO YOU ENGAGE IN EXERCISE AT THIS LEVEL?: 30 MIN

## 2025-08-14 ASSESSMENT — SOCIAL DETERMINANTS OF HEALTH (SDOH): HOW OFTEN DO YOU GET TOGETHER WITH FRIENDS OR RELATIVES?: MORE THAN THREE TIMES A WEEK

## 2025-08-18 LAB — HEMOCCULT STL QL IA: NEGATIVE

## 2025-08-19 ENCOUNTER — OFFICE VISIT (OUTPATIENT)
Dept: FAMILY MEDICINE | Facility: CLINIC | Age: 75
End: 2025-08-19
Attending: PHYSICIAN ASSISTANT
Payer: COMMERCIAL

## 2025-08-19 VITALS
TEMPERATURE: 98 F | OXYGEN SATURATION: 100 % | BODY MASS INDEX: 31.92 KG/M2 | DIASTOLIC BLOOD PRESSURE: 70 MMHG | RESPIRATION RATE: 18 BRPM | SYSTOLIC BLOOD PRESSURE: 152 MMHG | WEIGHT: 228 LBS | HEART RATE: 66 BPM | HEIGHT: 71 IN

## 2025-08-19 DIAGNOSIS — N40.0 BENIGN PROSTATIC HYPERPLASIA WITHOUT LOWER URINARY TRACT SYMPTOMS: ICD-10-CM

## 2025-08-19 DIAGNOSIS — K21.9 GASTROESOPHAGEAL REFLUX DISEASE WITHOUT ESOPHAGITIS: ICD-10-CM

## 2025-08-19 DIAGNOSIS — R03.0 ELEVATED BLOOD PRESSURE READING WITHOUT DIAGNOSIS OF HYPERTENSION: ICD-10-CM

## 2025-08-19 DIAGNOSIS — Z12.5 SCREENING FOR PROSTATE CANCER: ICD-10-CM

## 2025-08-19 DIAGNOSIS — E78.00 PURE HYPERCHOLESTEROLEMIA: ICD-10-CM

## 2025-08-19 DIAGNOSIS — Z00.00 ENCOUNTER FOR ANNUAL WELLNESS VISIT (AWV) IN MEDICARE PATIENT: Primary | ICD-10-CM

## 2025-08-19 DIAGNOSIS — Z23 NEED FOR VACCINATION: ICD-10-CM

## 2025-08-19 LAB
ALBUMIN SERPL BCG-MCNC: 4.5 G/DL (ref 3.5–5.2)
ALP SERPL-CCNC: 63 U/L (ref 40–150)
ALT SERPL W P-5'-P-CCNC: 13 U/L (ref 0–70)
ANION GAP SERPL CALCULATED.3IONS-SCNC: 10 MMOL/L (ref 7–15)
AST SERPL W P-5'-P-CCNC: 19 U/L (ref 0–45)
BILIRUB SERPL-MCNC: 1.5 MG/DL
BUN SERPL-MCNC: 17.2 MG/DL (ref 8–23)
CALCIUM SERPL-MCNC: 9.8 MG/DL (ref 8.8–10.4)
CHLORIDE SERPL-SCNC: 103 MMOL/L (ref 98–107)
CHOLEST SERPL-MCNC: 163 MG/DL
CREAT SERPL-MCNC: 1.12 MG/DL (ref 0.67–1.17)
EGFRCR SERPLBLD CKD-EPI 2021: 69 ML/MIN/1.73M2
ERYTHROCYTE [DISTWIDTH] IN BLOOD BY AUTOMATED COUNT: 12 % (ref 10–15)
FASTING STATUS PATIENT QL REPORTED: YES
FASTING STATUS PATIENT QL REPORTED: YES
GLUCOSE SERPL-MCNC: 103 MG/DL (ref 70–99)
HCO3 SERPL-SCNC: 27 MMOL/L (ref 22–29)
HCT VFR BLD AUTO: 46.3 % (ref 40–53)
HDLC SERPL-MCNC: 43 MG/DL
HGB BLD-MCNC: 15.2 G/DL (ref 13.3–17.7)
LDLC SERPL CALC-MCNC: 98 MG/DL
MCH RBC QN AUTO: 29.1 PG (ref 26.5–33)
MCHC RBC AUTO-ENTMCNC: 32.8 G/DL (ref 31.5–36.5)
MCV RBC AUTO: 88.5 FL (ref 78–100)
NONHDLC SERPL-MCNC: 120 MG/DL
PLATELET # BLD AUTO: 228 10E3/UL (ref 150–450)
POTASSIUM SERPL-SCNC: 4.7 MMOL/L (ref 3.4–5.3)
PROT SERPL-MCNC: 7.7 G/DL (ref 6.4–8.3)
PSA SERPL DL<=0.01 NG/ML-MCNC: 1.75 NG/ML (ref 0–6.5)
RBC # BLD AUTO: 5.23 10E6/UL (ref 4.4–5.9)
SODIUM SERPL-SCNC: 140 MMOL/L (ref 135–145)
TRIGL SERPL-MCNC: 111 MG/DL
WBC # BLD AUTO: 6.71 10E3/UL (ref 4–11)

## 2025-08-19 PROCEDURE — 36415 COLL VENOUS BLD VENIPUNCTURE: CPT | Performed by: PHYSICIAN ASSISTANT

## 2025-08-19 PROCEDURE — 80061 LIPID PANEL: CPT | Performed by: PHYSICIAN ASSISTANT

## 2025-08-19 PROCEDURE — G0103 PSA SCREENING: HCPCS | Performed by: PHYSICIAN ASSISTANT

## 2025-08-19 PROCEDURE — 85027 COMPLETE CBC AUTOMATED: CPT | Performed by: PHYSICIAN ASSISTANT

## 2025-08-19 PROCEDURE — 3077F SYST BP >= 140 MM HG: CPT | Performed by: PHYSICIAN ASSISTANT

## 2025-08-19 PROCEDURE — 80053 COMPREHEN METABOLIC PANEL: CPT | Performed by: PHYSICIAN ASSISTANT

## 2025-08-19 PROCEDURE — 3078F DIAST BP <80 MM HG: CPT | Performed by: PHYSICIAN ASSISTANT

## 2025-08-19 PROCEDURE — 99213 OFFICE O/P EST LOW 20 MIN: CPT | Mod: 25 | Performed by: PHYSICIAN ASSISTANT

## 2025-08-19 PROCEDURE — G0439 PPPS, SUBSEQ VISIT: HCPCS | Performed by: PHYSICIAN ASSISTANT

## 2025-08-19 RX ORDER — PANTOPRAZOLE SODIUM 40 MG/1
40 TABLET, DELAYED RELEASE ORAL DAILY
Qty: 90 TABLET | Refills: 3 | Status: SHIPPED | OUTPATIENT
Start: 2025-08-19

## 2025-08-19 RX ORDER — PRAVASTATIN SODIUM 20 MG
20 TABLET ORAL DAILY
Qty: 90 TABLET | Refills: 3 | Status: SHIPPED | OUTPATIENT
Start: 2025-08-19

## 2025-08-19 SDOH — HEALTH STABILITY: PHYSICAL HEALTH: ON AVERAGE, HOW MANY MINUTES DO YOU ENGAGE IN EXERCISE AT THIS LEVEL?: 30 MIN

## 2025-08-19 SDOH — HEALTH STABILITY: PHYSICAL HEALTH: ON AVERAGE, HOW MANY DAYS PER WEEK DO YOU ENGAGE IN MODERATE TO STRENUOUS EXERCISE (LIKE A BRISK WALK)?: 5 DAYS

## 2025-08-19 ASSESSMENT — SOCIAL DETERMINANTS OF HEALTH (SDOH): HOW OFTEN DO YOU GET TOGETHER WITH FRIENDS OR RELATIVES?: MORE THAN THREE TIMES A WEEK
